# Patient Record
Sex: FEMALE | HISPANIC OR LATINO | Employment: PART TIME | URBAN - METROPOLITAN AREA
[De-identification: names, ages, dates, MRNs, and addresses within clinical notes are randomized per-mention and may not be internally consistent; named-entity substitution may affect disease eponyms.]

---

## 2018-03-20 ENCOUNTER — OFFICE VISIT (OUTPATIENT)
Dept: OBSTETRICS AND GYNECOLOGY | Facility: CLINIC | Age: 43
End: 2018-03-20
Payer: MEDICAID

## 2018-03-20 VITALS
SYSTOLIC BLOOD PRESSURE: 112 MMHG | HEIGHT: 63 IN | WEIGHT: 154.13 LBS | BODY MASS INDEX: 27.31 KG/M2 | DIASTOLIC BLOOD PRESSURE: 72 MMHG

## 2018-03-20 DIAGNOSIS — N84.1 ENDOCERVICAL POLYP: ICD-10-CM

## 2018-03-20 DIAGNOSIS — Z01.419 WELL WOMAN EXAM WITH ROUTINE GYNECOLOGICAL EXAM: ICD-10-CM

## 2018-03-20 DIAGNOSIS — Z32.01 POSITIVE URINE PREGNANCY TEST: Primary | ICD-10-CM

## 2018-03-20 DIAGNOSIS — O09.529 ANTEPARTUM MULTIGRAVIDA OF ADVANCED MATERNAL AGE: ICD-10-CM

## 2018-03-20 DIAGNOSIS — Z87.898 HISTORY OF PREDIABETES: ICD-10-CM

## 2018-03-20 PROCEDURE — 99386 PREV VISIT NEW AGE 40-64: CPT | Mod: S$PBB,,, | Performed by: OBSTETRICS & GYNECOLOGY

## 2018-03-20 PROCEDURE — 88175 CYTOPATH C/V AUTO FLUID REDO: CPT

## 2018-03-20 PROCEDURE — 87086 URINE CULTURE/COLONY COUNT: CPT

## 2018-03-20 PROCEDURE — 80307 DRUG TEST PRSMV CHEM ANLYZR: CPT

## 2018-03-20 PROCEDURE — 99202 OFFICE O/P NEW SF 15 MIN: CPT | Mod: PBBFAC,TH,PO | Performed by: OBSTETRICS & GYNECOLOGY

## 2018-03-20 PROCEDURE — 99999 PR PBB SHADOW E&M-NEW PATIENT-LVL II: CPT | Mod: PBBFAC,,, | Performed by: OBSTETRICS & GYNECOLOGY

## 2018-03-20 PROCEDURE — 87491 CHLMYD TRACH DNA AMP PROBE: CPT

## 2018-03-20 NOTE — LETTER
March 20, 2018      April Murillo MD  [address] [facility] [address]  [facility] [address]           [place] [facility]  [address] [facility], [address]  [address]th [address] [facility] [address]  Phone: [phone]          Patient: Francie Redding   MR Number: [id number]   YOB: 1975   Date of Visit: 3/20/2018       Dear Dr. April Murillo:    Thank you for referring Francie Redding to me for evaluation. Attached you will find relevant portions of my assessment and plan of care.    If you have questions, please do not hesitate to call me. I look forward to following Francie Redding along with you.    Sincerely,    Tank Moon MD    Enclosure  CC:  No Recipients    If you would like to receive this communication electronically, please contact [email or web address] or [phone] to request more information on [facility] [employer] access.    For providers and/or their staff who would like to refer a patient to [facility], please contact us through our one-stop-shop provider referral line, [facility], at [phone].    If you feel you have received this communication in error or would no longer like to receive these types of communications, please e-mail [email or web address]

## 2018-03-20 NOTE — PROGRESS NOTES
CC: Annual check-up, +upt    SUBJECTIVE:   42 y.o. female   for annual routine Pap and checkup. Patient's last menstrual period was 2018..  She complains of vaginal bleeding.        History reviewed. No pertinent past medical history.  History reviewed. No pertinent surgical history.  Social History     Social History    Marital status: Single     Spouse name: N/A    Number of children: N/A    Years of education: N/A     Occupational History    Not on file.     Social History Main Topics    Smoking status: Never Smoker    Smokeless tobacco: Never Used    Alcohol use No    Drug use: No    Sexual activity: No     Other Topics Concern    Not on file     Social History Narrative    No narrative on file     History reviewed. No pertinent family history.  OB History    Para Term  AB Living   4 1 1   2     SAB TAB Ectopic Multiple Live Births   2              # Outcome Date GA Lbr Evan/2nd Weight Sex Delivery Anes PTL Lv   4 Current            3 SAB            2 SAB            1 Term      Vag-Spont               Current Outpatient Prescriptions   Medication Sig Dispense Refill    ibuprofen (ADVIL,MOTRIN) 200 MG tablet Take 200 mg by mouth every 6 (six) hours as needed for Pain.      misoprostol (CYTOTEC) 200 MCG Tab Take 1 tablet (200 mcg total) by mouth 2 (two) times daily. 6 tablet 0    naproxen (NAPROSYN) 500 MG tablet Take 1 tablet (500 mg total) by mouth 2 (two) times daily with meals. 20 tablet 0     No current facility-administered medications for this visit.      Allergies: Patient has no known allergies.     ROS:  Constitutional: no weight loss, weight gain, fever, fatigue  Eyes:  No vision changes, glasses/contacts  ENT/Mouth: No ulcers, sinus problems, ears ringing, headache  Cardiovascular: No inability to lie flat, chest pain, exercise intolerance, swelling, heart palpitations  Respiratory: No wheezing, coughing blood, shortness of breath, or cough  Gastrointestinal: No  "diarrhea, bloody stool, nausea/vomiting, constipation, gas, hemorrhoids  Genitourinary: No blood in urine, painful urination, urgency of urination, frequency of urination, incomplete emptying, incontinence, abnormal bleeding, painful periods, heavy periods, vaginal discharge, vaginal odor, painful intercourse, sexual problems, bleeding after intercourse.  Musculoskeletal: No muscle weakness  Skin/Breast: No painful breasts, nipple discharge, masses, rash, ulcers  Neurological: No passing out, seizures, numbness, headache  Endocrine: No diabetes, hypothyroid, hyperthyroid, hot flashes, hair loss, abnormal hair growth, ance  Psychiatric: No depression, crying  Hematologic: No bruises, bleeding, swollen lymph nodes, anemia.      OBJECTIVE:   The patient appears well, alert, oriented x 3, in no distress.  /72   Ht 5' 3" (1.6 m)   Wt 69.9 kg (154 lb 1.6 oz)   LMP 01/23/2018   BMI 27.30 kg/m²   NECK: no thyromegaly, trachea midline  SKIN: no acne, striae, hirsutism  BREAST EXAM: not examined  ABDOMEN: no hernias, masses, or hepatosplenomegaly  GENITALIA: normal external genitalia, no erythema, no discharge  URETHRA: normal urethra, normal urethral meatus  VAGINA: old blood in vault  CERVIX: endocx polyp  UTERUS: enlarged, 12 weeks size  ADNEXA: normal adnexa      ASSESSMENT:   well woman  1. Positive urine pregnancy test    2. Well woman exam with routine gynecological exam    3. Endocervical polyp    4. History of prediabetes    5. Antepartum multigravida of advanced maternal age        PLAN:   pap smear  return after u/s in 1 wk  Orders Placed This Encounter    C. trachomatis/N. gonorrhoeae by AMP DNA Cervix    Urine culture    Liquid-based pap smear, screening    US OB/GYN Procedure ([facility])-Future     "

## 2018-03-21 LAB
AMPHET+METHAMPHET UR QL: NEGATIVE
BARBITURATES UR QL SCN>200 NG/ML: NEGATIVE
BENZODIAZ UR QL SCN>200 NG/ML: NEGATIVE
BZE UR QL SCN: NEGATIVE
C TRACH DNA SPEC QL NAA+PROBE: NOT DETECTED
CANNABINOIDS UR QL SCN: NEGATIVE
CREAT UR-MCNC: 73 MG/DL
ETHANOL UR-MCNC: <10 MG/DL
METHADONE UR QL SCN>300 NG/ML: NEGATIVE
N GONORRHOEA DNA SPEC QL NAA+PROBE: NOT DETECTED
OPIATES UR QL SCN: NEGATIVE
PCP UR QL SCN>25 NG/ML: NEGATIVE
TOXICOLOGY INFORMATION: NORMAL

## 2018-03-22 LAB — BACTERIA UR CULT: NO GROWTH

## 2018-03-27 ENCOUNTER — ROUTINE PRENATAL (OUTPATIENT)
Dept: OBSTETRICS AND GYNECOLOGY | Facility: CLINIC | Age: 43
End: 2018-03-27
Payer: MEDICAID

## 2018-03-27 ENCOUNTER — LAB VISIT (OUTPATIENT)
Dept: LAB | Facility: HOSPITAL | Age: 43
End: 2018-03-27
Attending: OBSTETRICS & GYNECOLOGY
Payer: MEDICAID

## 2018-03-27 ENCOUNTER — PROCEDURE VISIT (OUTPATIENT)
Dept: OBSTETRICS AND GYNECOLOGY | Facility: CLINIC | Age: 43
End: 2018-03-27
Payer: MEDICAID

## 2018-03-27 VITALS
BODY MASS INDEX: 27.14 KG/M2 | SYSTOLIC BLOOD PRESSURE: 106 MMHG | DIASTOLIC BLOOD PRESSURE: 68 MMHG | WEIGHT: 153.25 LBS

## 2018-03-27 DIAGNOSIS — Z3A.09 9 WEEKS GESTATION OF PREGNANCY: Primary | ICD-10-CM

## 2018-03-27 DIAGNOSIS — Z3A.09 9 WEEKS GESTATION OF PREGNANCY: ICD-10-CM

## 2018-03-27 DIAGNOSIS — O09.529 ENCOUNTER FOR SUPERVISION OF HIGH RISK MULTIGRAVIDA OF ADVANCED MATERNAL AGE, ANTEPARTUM: Primary | ICD-10-CM

## 2018-03-27 DIAGNOSIS — Z36.89 ENCOUNTER TO ESTABLISH GESTATIONAL AGE USING ULTRASOUND: ICD-10-CM

## 2018-03-27 DIAGNOSIS — Z32.01 POSITIVE URINE PREGNANCY TEST: ICD-10-CM

## 2018-03-27 LAB
ABO + RH BLD: NORMAL
ANISOCYTOSIS BLD QL SMEAR: SLIGHT
BASOPHILS # BLD AUTO: 0.06 K/UL
BASOPHILS NFR BLD: 0.5 %
BLD GP AB SCN CELLS X3 SERPL QL: NORMAL
DACRYOCYTES BLD QL SMEAR: ABNORMAL
DIFFERENTIAL METHOD: ABNORMAL
EOSINOPHIL # BLD AUTO: 0.9 K/UL
EOSINOPHIL NFR BLD: 7.5 %
ERYTHROCYTE [DISTWIDTH] IN BLOOD BY AUTOMATED COUNT: 20.4 %
ESTIMATED AVG GLUCOSE: 108 MG/DL
HBA1C MFR BLD HPLC: 5.4 %
HCT VFR BLD AUTO: 32.2 %
HGB BLD-MCNC: 9.7 G/DL
HYPOCHROMIA BLD QL SMEAR: ABNORMAL
LYMPHOCYTES # BLD AUTO: 1.7 K/UL
LYMPHOCYTES NFR BLD: 14.8 %
MCH RBC QN AUTO: 19.4 PG
MCHC RBC AUTO-ENTMCNC: 30.1 G/DL
MCV RBC AUTO: 64 FL
MONOCYTES # BLD AUTO: 0.8 K/UL
MONOCYTES NFR BLD: 6.7 %
NEUTROPHILS # BLD AUTO: 8.2 K/UL
NEUTROPHILS NFR BLD: 70.5 %
OVALOCYTES BLD QL SMEAR: ABNORMAL
PLATELET # BLD AUTO: 320 K/UL
PLATELET BLD QL SMEAR: ABNORMAL
PMV BLD AUTO: 9 FL
POIKILOCYTOSIS BLD QL SMEAR: SLIGHT
RBC # BLD AUTO: 5 M/UL
WBC # BLD AUTO: 11.72 K/UL

## 2018-03-27 PROCEDURE — 86850 RBC ANTIBODY SCREEN: CPT

## 2018-03-27 PROCEDURE — 99999 PR PBB SHADOW E&M-EST. PATIENT-LVL II: CPT | Mod: PBBFAC,,, | Performed by: OBSTETRICS & GYNECOLOGY

## 2018-03-27 PROCEDURE — 86703 HIV-1/HIV-2 1 RESULT ANTBDY: CPT

## 2018-03-27 PROCEDURE — 99213 OFFICE O/P EST LOW 20 MIN: CPT | Mod: TH,S$PBB,25, | Performed by: OBSTETRICS & GYNECOLOGY

## 2018-03-27 PROCEDURE — 86762 RUBELLA ANTIBODY: CPT

## 2018-03-27 PROCEDURE — 76817 TRANSVAGINAL US OBSTETRIC: CPT | Mod: PBBFAC,PO

## 2018-03-27 PROCEDURE — 76817 TRANSVAGINAL US OBSTETRIC: CPT | Mod: 26,S$PBB,, | Performed by: OBSTETRICS & GYNECOLOGY

## 2018-03-27 PROCEDURE — 87340 HEPATITIS B SURFACE AG IA: CPT

## 2018-03-27 PROCEDURE — 36415 COLL VENOUS BLD VENIPUNCTURE: CPT

## 2018-03-27 PROCEDURE — 85025 COMPLETE CBC W/AUTO DIFF WBC: CPT

## 2018-03-27 PROCEDURE — 99212 OFFICE O/P EST SF 10 MIN: CPT | Mod: PBBFAC,PO | Performed by: OBSTETRICS & GYNECOLOGY

## 2018-03-27 PROCEDURE — 83036 HEMOGLOBIN GLYCOSYLATED A1C: CPT

## 2018-03-27 PROCEDURE — 86592 SYPHILIS TEST NON-TREP QUAL: CPT

## 2018-03-27 RX ORDER — DIPHENHYDRAMINE HCL 25 MG
25 CAPSULE ORAL EVERY 6 HOURS PRN
COMMUNITY
End: 2018-05-29

## 2018-03-27 NOTE — PROGRESS NOTES
Doing well and denies any complains  Viable intrauterine pregnancy visible on ultrasound, consistent with LMP  CBC, type and screen, Hb A1c rubella, RPR, HIV and Hep B Ag ordered  MFM consulted on previous visit  Large endocx polyp    Discussed: Blood test ordered on first visit, Vitamins, folic acid, Wt Gain, Seafood and mercury, avoid deli food, unrefrigerated meats, cheeses and milk products, reason to avoid cat litter, the  accuracy of the LUCIUS, the value of an early TVS, risks of each trimester,  Aneuploidy screening, OTC medication in the first trimester, harmful effects of Smoking and ETOH, AFP screen at 15 weeks and Anatomy +/- MFM US at 18-20 weeks.  Discussed: Common complaints of pregnancy, HIV and other routine prenatal tests, Tobacco abuse, risk factors identified by prenatal history, Anticipated course of prenatal care, Nutrition and weight gain counseling,Toxoplasmosis precautions (Cats/Raw Meat) Exercise, Alcohol, Illicit/Recreational Drugs, Seat belt use, Childbirth classes/Hospital facilities.The counseling session lasted approximately 25 minutes, and all her questions were answered.

## 2018-03-28 LAB
HBV SURFACE AG SERPL QL IA: NEGATIVE
HIV 1+2 AB+HIV1 P24 AG SERPL QL IA: NEGATIVE
RPR SER QL: NORMAL
RUBV IGG SER-ACNC: 27.8 IU/ML
RUBV IGG SER-IMP: REACTIVE

## 2018-04-09 ENCOUNTER — TELEPHONE (OUTPATIENT)
Dept: OBSTETRICS AND GYNECOLOGY | Facility: CLINIC | Age: 43
End: 2018-04-09

## 2018-04-09 NOTE — TELEPHONE ENCOUNTER
Pt called to see what she could take for her cough an sore throat. Pt was instructed to ready the instructions given to her on her first visit to see what she can take. Pt was told to try the medications and if all of them don't work to give us a call. Pt verbalized understanding.

## 2018-04-09 NOTE — TELEPHONE ENCOUNTER
----- Message from Charlette Carbajal sent at 4/9/2018  9:42 AM CDT -----  Contact: Self [phone]  Patient is 11 weeks pregnant and she would like to know what medication can she take for a sore throat and severe cough. Please advice

## 2018-04-16 PROCEDURE — 99284 EMERGENCY DEPT VISIT MOD MDM: CPT | Mod: 25

## 2018-04-17 ENCOUNTER — HOSPITAL ENCOUNTER (EMERGENCY)
Facility: HOSPITAL | Age: 43
Discharge: HOME | End: 2018-04-17
Attending: EMERGENCY MEDICINE
Payer: MEDICAID

## 2018-04-17 VITALS
OXYGEN SATURATION: 99 % | TEMPERATURE: 98 F | SYSTOLIC BLOOD PRESSURE: 115 MMHG | WEIGHT: 150 LBS | DIASTOLIC BLOOD PRESSURE: 61 MMHG | BODY MASS INDEX: 25.61 KG/M2 | HEART RATE: 66 BPM | RESPIRATION RATE: 20 BRPM | HEIGHT: 64 IN

## 2018-04-17 DIAGNOSIS — O20.0 THREATENED ABORTION: Primary | ICD-10-CM

## 2018-04-17 DIAGNOSIS — O20.9 VAGINAL BLEEDING AFFECTING EARLY PREGNANCY: ICD-10-CM

## 2018-04-17 LAB
ANISOCYTOSIS BLD QL SMEAR: SLIGHT
BACTERIA #/AREA URNS HPF: NORMAL /HPF
BASOPHILS # BLD AUTO: 0.03 K/UL
BASOPHILS NFR BLD: 0.3 %
BILIRUB UR QL STRIP: NEGATIVE
CLARITY UR: CLEAR
COLOR UR: YELLOW
DACRYOCYTES BLD QL SMEAR: ABNORMAL
DIFFERENTIAL METHOD: ABNORMAL
EOSINOPHIL # BLD AUTO: 0.2 K/UL
EOSINOPHIL NFR BLD: 1.4 %
ERYTHROCYTE [DISTWIDTH] IN BLOOD BY AUTOMATED COUNT: 20.4 %
GLUCOSE UR QL STRIP: NEGATIVE
HCG INTACT+B SERPL-ACNC: NORMAL MIU/ML
HCT VFR BLD AUTO: 30.9 %
HGB BLD-MCNC: 9.4 G/DL
HGB UR QL STRIP: ABNORMAL
HYPOCHROMIA BLD QL SMEAR: ABNORMAL
KETONES UR QL STRIP: NEGATIVE
LEUKOCYTE ESTERASE UR QL STRIP: ABNORMAL
LYMPHOCYTES # BLD AUTO: 1.7 K/UL
LYMPHOCYTES NFR BLD: 15.1 %
MCH RBC QN AUTO: 20 PG
MCHC RBC AUTO-ENTMCNC: 30.4 G/DL
MCV RBC AUTO: 66 FL
MICROSCOPIC COMMENT: NORMAL
MONOCYTES # BLD AUTO: 1.1 K/UL
MONOCYTES NFR BLD: 9.5 %
NEUTROPHILS # BLD AUTO: 8.5 K/UL
NEUTROPHILS NFR BLD: 73.7 %
NITRITE UR QL STRIP: NEGATIVE
OVALOCYTES BLD QL SMEAR: ABNORMAL
PH UR STRIP: 6 [PH] (ref 5–8)
PLATELET # BLD AUTO: 252 K/UL
PLATELET BLD QL SMEAR: ABNORMAL
PMV BLD AUTO: 8.7 FL
POIKILOCYTOSIS BLD QL SMEAR: SLIGHT
POLYCHROMASIA BLD QL SMEAR: ABNORMAL
PROT UR QL STRIP: NEGATIVE
RBC # BLD AUTO: 4.71 M/UL
RBC #/AREA URNS HPF: 2 /HPF (ref 0–4)
SP GR UR STRIP: 1.02 (ref 1–1.03)
SQUAMOUS #/AREA URNS HPF: 2 /HPF
STOMATOCYTES BLD QL SMEAR: PRESENT
TARGETS BLD QL SMEAR: ABNORMAL
URN SPEC COLLECT METH UR: ABNORMAL
UROBILINOGEN UR STRIP-ACNC: NEGATIVE EU/DL
WBC # BLD AUTO: 11.53 K/UL
WBC #/AREA URNS HPF: 1 /HPF (ref 0–5)

## 2018-04-17 PROCEDURE — 81000 URINALYSIS NONAUTO W/SCOPE: CPT

## 2018-04-17 PROCEDURE — 25000003 PHARM REV CODE 250: Performed by: EMERGENCY MEDICINE

## 2018-04-17 PROCEDURE — 85025 COMPLETE CBC W/AUTO DIFF WBC: CPT

## 2018-04-17 PROCEDURE — 84702 CHORIONIC GONADOTROPIN TEST: CPT

## 2018-04-17 RX ORDER — DICYCLOMINE HYDROCHLORIDE 20 MG/1
20 TABLET ORAL 2 TIMES DAILY
Qty: 20 TABLET | Refills: 0 | Status: SHIPPED | OUTPATIENT
Start: 2018-04-17 | End: 2018-05-08

## 2018-04-17 RX ORDER — DICYCLOMINE HYDROCHLORIDE 10 MG/1
20 CAPSULE ORAL
Status: COMPLETED | OUTPATIENT
Start: 2018-04-17 | End: 2018-04-17

## 2018-04-17 RX ADMIN — DICYCLOMINE HYDROCHLORIDE 20 MG: 10 CAPSULE ORAL at 04:04

## 2018-04-17 NOTE — ED PROVIDER NOTES
Encounter Date: 4/16/2018    SCRIBE #1 NOTE: IMarilu am scribing for, and in the presence of, Dr. Lombardi.       History     Chief Complaint   Patient presents with    Vaginal Bleeding     approx. 12 weeks pregnant with onset of abdominal pain and vaginal bleeding today.      Francie Redding is a 42 y.o. female who  has no past medical history on file.    CC: vaginal bleeding    The patient who is approximately 12 weeks pregnant presents to the ED due to vaginal bleeding that began today.  Pt reports associated LLQ abdominal cramping that began yesterday.  She denies any n/v.  She did not try anything for pain at home.  Pt notes she has been having cough and sore throat for 2 weeks.  Pt states she was seen by OBGYN on 3/27/18.  US at that time revealed visible viable intrauterine pregnancy.        The history is provided by the patient. A  was used.     Review of patient's allergies indicates:  No Known Allergies  History reviewed. No pertinent past medical history.  History reviewed. No pertinent surgical history.  History reviewed. No pertinent family history.  Social History   Substance Use Topics    Smoking status: Never Smoker    Smokeless tobacco: Never Used    Alcohol use No     Review of Systems   Constitutional: Negative for fever.   HENT: Positive for sore throat.    Respiratory: Positive for cough.    Gastrointestinal: Positive for abdominal pain. Negative for nausea and vomiting.   Genitourinary: Positive for vaginal bleeding.   Musculoskeletal: Negative for back pain.   Neurological: Negative for headaches.       Physical Exam     Initial Vitals [04/16/18 2351]   BP Pulse Resp Temp SpO2   (!) 149/67 75 20 98.1 °F (36.7 °C) 100 %      MAP       94.33         Physical Exam    Nursing note and vitals reviewed.  Constitutional: She appears well-developed and well-nourished.   HENT:   Head: Normocephalic and atraumatic.   Eyes: Conjunctivae are normal.   Neck: Normal  range of motion. Neck supple.   Cardiovascular: Normal rate, regular rhythm and normal heart sounds.   Pulmonary/Chest: Breath sounds normal. No respiratory distress.   Abdominal: Soft. There is tenderness in the suprapubic area and left lower quadrant.   Musculoskeletal: Normal range of motion. She exhibits no edema or tenderness.   Neurological: She is alert and oriented to person, place, and time. She has normal strength.   Skin: Skin is warm and dry.   Psychiatric: She has a normal mood and affect.         ED Course   Procedures  Labs Reviewed   URINALYSIS - Abnormal; Notable for the following:        Result Value    Occult Blood UA 3+ (*)     Leukocytes, UA Trace (*)     All other components within normal limits   CBC W/ AUTO DIFFERENTIAL - Abnormal; Notable for the following:     Hemoglobin 9.4 (*)     Hematocrit 30.9 (*)     MCV 66 (*)     MCH 20.0 (*)     MCHC 30.4 (*)     RDW 20.4 (*)     MPV 8.7 (*)     Gran # (ANC) 8.5 (*)     Mono # 1.1 (*)     Gran% 73.7 (*)     Lymph% 15.1 (*)     All other components within normal limits   HCG, QUANTITATIVE, PREGNANCY   URINALYSIS MICROSCOPIC        Imaging Results          US OB Less Than 14 Wks First Gestation (Final result)  Result time 04/17/18 03:44:39    Final result by Joaquín Patino MD (04/17/18 03:44:39)                 Impression:      Single live intrauterine pregnancy with estimated gestational age 12 weeks 0 days.  Fetal heart rate is 163 beats per minute.  Continued sonographic follow-up is advised.    Posterior uterine fibroid.      Electronically signed by: Joaquín Patino MD  Date:    04/17/2018  Time:    03:44             Narrative:    EXAMINATION:  US OB LESS THAN 14 WEEKS FIRST GESTATION    CLINICAL HISTORY:  Other hemorrhage in early pregnancy    TECHNIQUE:  Transabdominal sonography of the pelvis was performed.    COMPARISON:  None.    FINDINGS:  The uterus measures 13.4 x 9.5 x 10.3 cm. There is an apparent posterior uterine fibroid  measuring approximately 4.8 x 4.7 x 5.0 cm.    In the uterine cavity, there is a gestational sac containing a fetal pole measuring 5.3 cm by crown rump length and corresponds to a 12 weeks 0 days gestation. Fetal heart rate is 163 BPM.  The yolk sac was not well visualized.    The right ovary measures 3.9 x 2.2 x 2.6 cm. The left ovary measures 3.6 x 3.6 x 1.3 cm. Arterial and venous flow are preserved bilaterally.  There are small bilateral ovarian cysts.  No free fluid is seen.                                  Medical Decision Making:   History:   Old Medical Records: I decided to obtain old medical records.  Initial Assessment:   42 y.o. female who is 12 weeks pregnant presents with vaginal bleeding that began yesterday and LLQ abdominal pain.  Exam with LLQ and suprapubic tenderness.  Pelvic exam with small amount of dark blood; no clots.    Clinical Tests:   Lab Tests: Ordered and Reviewed  Radiological Study: Ordered and Reviewed  ED Management:  Will obtain labs including CBC, UA, hCG, and US to further evaluate.    US revealed a single live intrauterine pregnancy with fetal heart rate of 163 bpm.  Pt will be d/c home on bentyl.  I advised her to f/u with OBGYN.       Chart review shows that the patient is O+ blood type                      Clinical Impression:   The primary encounter diagnosis was Threatened . A diagnosis of Vaginal bleeding affecting early pregnancy was also pertinent to this visit.    Disposition:   Disposition: Discharged  Condition: Stable            I, Dr. Ayse Lombardi, personally performed the services described in this documentation. All medical record entries made by the scribe were at my direction and in my presence.  I have reviewed the chart and agree that the record reflects my personal performance and is accurate and complete. Ayse Lombardi MD.  6:14 AM 2018             Ayse Lombardi MD  18 0614

## 2018-04-17 NOTE — ED TRIAGE NOTES
Pt presents to ED with c/o vaginal bleeding and abdominal pain. Pt is 12 weeks gestation. Symptoms began today.

## 2018-04-24 ENCOUNTER — ROUTINE PRENATAL (OUTPATIENT)
Dept: OBSTETRICS AND GYNECOLOGY | Facility: CLINIC | Age: 43
End: 2018-04-24
Payer: MEDICAID

## 2018-04-24 VITALS — DIASTOLIC BLOOD PRESSURE: 62 MMHG | SYSTOLIC BLOOD PRESSURE: 90 MMHG

## 2018-04-24 DIAGNOSIS — N84.1 ENDOCERVICAL POLYP: ICD-10-CM

## 2018-04-24 DIAGNOSIS — Z3A.13 13 WEEKS GESTATION OF PREGNANCY: Primary | ICD-10-CM

## 2018-04-24 DIAGNOSIS — O21.9 NAUSEA AND VOMITING IN PREGNANCY PRIOR TO 22 WEEKS GESTATION: ICD-10-CM

## 2018-04-24 PROCEDURE — 99999 PR PBB SHADOW E&M-EST. PATIENT-LVL I: CPT | Mod: PBBFAC,,, | Performed by: OBSTETRICS & GYNECOLOGY

## 2018-04-24 PROCEDURE — 99211 OFF/OP EST MAY X REQ PHY/QHP: CPT | Mod: PBBFAC,TH,PO | Performed by: OBSTETRICS & GYNECOLOGY

## 2018-04-24 PROCEDURE — 99213 OFFICE O/P EST LOW 20 MIN: CPT | Mod: TH,S$PBB,, | Performed by: OBSTETRICS & GYNECOLOGY

## 2018-04-24 RX ORDER — PROMETHAZINE HYDROCHLORIDE 12.5 MG/1
12.5 SUPPOSITORY RECTAL EVERY 4 HOURS PRN
Qty: 12 SUPPOSITORY | Refills: 1 | Status: SHIPPED | OUTPATIENT
Start: 2018-04-24 | End: 2018-05-08

## 2018-04-24 RX ORDER — PROMETHAZINE HYDROCHLORIDE 12.5 MG/1
12.5 TABLET ORAL EVERY 4 HOURS PRN
Qty: 100 TABLET | Refills: 2 | Status: SHIPPED | OUTPATIENT
Start: 2018-04-24 | End: 2018-07-10

## 2018-04-24 NOTE — PROGRESS NOTES
Having some nausea and very little PO intake.  B6 is not working.  Went to ED with some vomiting and vaginal bleeding but bleeding has stopped.  No cramping. + fetal movement  Does not want Quad Screen  FHT's 140's  Will order PO promethazine and suppositories in case she needs them  RTC 3-4 wks

## 2018-05-08 ENCOUNTER — ROUTINE PRENATAL (OUTPATIENT)
Dept: OBSTETRICS AND GYNECOLOGY | Facility: CLINIC | Age: 43
End: 2018-05-08
Payer: MEDICAID

## 2018-05-08 VITALS
WEIGHT: 155.44 LBS | BODY MASS INDEX: 26.68 KG/M2 | SYSTOLIC BLOOD PRESSURE: 102 MMHG | DIASTOLIC BLOOD PRESSURE: 66 MMHG

## 2018-05-08 DIAGNOSIS — Z3A.15 15 WEEKS GESTATION OF PREGNANCY: ICD-10-CM

## 2018-05-08 PROCEDURE — 99212 OFFICE O/P EST SF 10 MIN: CPT | Mod: PBBFAC,PO | Performed by: OBSTETRICS & GYNECOLOGY

## 2018-05-08 PROCEDURE — 99213 OFFICE O/P EST LOW 20 MIN: CPT | Mod: TH,S$PBB,, | Performed by: OBSTETRICS & GYNECOLOGY

## 2018-05-08 PROCEDURE — 99999 PR PBB SHADOW E&M-EST. PATIENT-LVL II: CPT | Mod: PBBFAC,,, | Performed by: OBSTETRICS & GYNECOLOGY

## 2018-05-08 RX ORDER — FERROUS SULFATE 325(65) MG
TABLET ORAL
COMMUNITY
End: 2018-10-23

## 2018-05-08 RX ORDER — ERGOCALCIFEROL 1.25 MG/1
CAPSULE ORAL
COMMUNITY
End: 2018-05-29

## 2018-05-08 NOTE — PROGRESS NOTES
Patient doing well, denies N/V, no longer requiring medication  +fetal movement, no contractions/vag dc/vag bleeding  FHT's 130's  RTC 3 wks, ultrasound in 5-6 wks

## 2018-05-29 ENCOUNTER — ROUTINE PRENATAL (OUTPATIENT)
Dept: OBSTETRICS AND GYNECOLOGY | Facility: CLINIC | Age: 43
End: 2018-05-29
Payer: MEDICAID

## 2018-05-29 VITALS
DIASTOLIC BLOOD PRESSURE: 64 MMHG | SYSTOLIC BLOOD PRESSURE: 102 MMHG | WEIGHT: 157.44 LBS | BODY MASS INDEX: 27.02 KG/M2

## 2018-05-29 DIAGNOSIS — Z3A.18 18 WEEKS GESTATION OF PREGNANCY: Primary | ICD-10-CM

## 2018-05-29 PROCEDURE — 99999 PR PBB SHADOW E&M-EST. PATIENT-LVL II: CPT | Mod: PBBFAC,,, | Performed by: OBSTETRICS & GYNECOLOGY

## 2018-05-29 PROCEDURE — 99213 OFFICE O/P EST LOW 20 MIN: CPT | Mod: TH,S$PBB,, | Performed by: OBSTETRICS & GYNECOLOGY

## 2018-05-29 PROCEDURE — 99212 OFFICE O/P EST SF 10 MIN: CPT | Mod: PBBFAC,PO | Performed by: OBSTETRICS & GYNECOLOGY

## 2018-05-29 NOTE — PROGRESS NOTES
Doing well.  +fetal movement, denies vag bleeding/ctx's.  FHT's 140's, will schedule ultrasound.   RTC 4 weeks.

## 2018-06-12 ENCOUNTER — ROUTINE PRENATAL (OUTPATIENT)
Dept: OBSTETRICS AND GYNECOLOGY | Facility: CLINIC | Age: 43
End: 2018-06-12
Payer: MEDICAID

## 2018-06-12 ENCOUNTER — PROCEDURE VISIT (OUTPATIENT)
Dept: OBSTETRICS AND GYNECOLOGY | Facility: CLINIC | Age: 43
End: 2018-06-12
Payer: MEDICAID

## 2018-06-12 VITALS — BODY MASS INDEX: 26.6 KG/M2 | WEIGHT: 155 LBS | SYSTOLIC BLOOD PRESSURE: 120 MMHG | DIASTOLIC BLOOD PRESSURE: 76 MMHG

## 2018-06-12 DIAGNOSIS — Z3A.15 15 WEEKS GESTATION OF PREGNANCY: ICD-10-CM

## 2018-06-12 DIAGNOSIS — O03.4 RETAINED PRODUCTS OF CONCEPTION AFTER MISCARRIAGE: ICD-10-CM

## 2018-06-12 DIAGNOSIS — D25.1 INTRAMURAL AND SUBMUCOUS LEIOMYOMA OF UTERUS: ICD-10-CM

## 2018-06-12 DIAGNOSIS — O09.529 AMA (ADVANCED MATERNAL AGE) MULTIGRAVIDA 35+, UNSPECIFIED TRIMESTER: Primary | ICD-10-CM

## 2018-06-12 DIAGNOSIS — O34.12 UTERINE FIBROIDS AFFECTING PREGNANCY IN SECOND TRIMESTER: ICD-10-CM

## 2018-06-12 DIAGNOSIS — O03.9 COMPLETE ABORTION: Primary | ICD-10-CM

## 2018-06-12 DIAGNOSIS — D25.9 UTERINE FIBROIDS AFFECTING PREGNANCY IN SECOND TRIMESTER: ICD-10-CM

## 2018-06-12 DIAGNOSIS — D25.0 INTRAMURAL AND SUBMUCOUS LEIOMYOMA OF UTERUS: ICD-10-CM

## 2018-06-12 PROCEDURE — 99999 PR PBB SHADOW E&M-EST. PATIENT-LVL II: CPT | Mod: PBBFAC,,, | Performed by: OBSTETRICS & GYNECOLOGY

## 2018-06-12 PROCEDURE — 99213 OFFICE O/P EST LOW 20 MIN: CPT | Mod: S$PBB,TH,25, | Performed by: OBSTETRICS & GYNECOLOGY

## 2018-06-12 PROCEDURE — 76805 OB US >/= 14 WKS SNGL FETUS: CPT | Mod: PBBFAC,PO

## 2018-06-12 PROCEDURE — 99212 OFFICE O/P EST SF 10 MIN: CPT | Mod: PBBFAC,TH,PO | Performed by: OBSTETRICS & GYNECOLOGY

## 2018-06-12 PROCEDURE — 76805 OB US >/= 14 WKS SNGL FETUS: CPT | Mod: 26,S$PBB,, | Performed by: OBSTETRICS & GYNECOLOGY

## 2018-06-12 RX ORDER — IBUPROFEN 800 MG/1
800 TABLET ORAL 3 TIMES DAILY
COMMUNITY
End: 2018-08-28

## 2018-06-25 ENCOUNTER — TELEPHONE (OUTPATIENT)
Dept: OBSTETRICS AND GYNECOLOGY | Facility: CLINIC | Age: 43
End: 2018-06-25

## 2018-06-25 NOTE — TELEPHONE ENCOUNTER
----- Message from Kamryn Ozuna sent at 6/25/2018  1:50 PM CDT -----  Contact: self, [phone]  Patient requests to speak with you regarding records from [place] for her to be able to claim her babies ashes. Please advise.

## 2018-06-25 NOTE — TELEPHONE ENCOUNTER
Spoke to Pt and she stated that she still hadn't gotten the ashes of her child and wanted to know if we had received the records from there. Pt was told that we had not and I call to get there number to fax over the release form. Pt was asked to come by to the office in [place] to get that signed and sent. Pt accepted and verbalized understanding.

## 2018-07-09 ENCOUNTER — TELEPHONE (OUTPATIENT)
Dept: OBSTETRICS AND GYNECOLOGY | Facility: CLINIC | Age: 43
End: 2018-07-09

## 2018-07-09 NOTE — TELEPHONE ENCOUNTER
----- Message from Charlette Carbajal sent at 7/9/2018 10:18 AM CDT -----  Contact: Self [phone]  Patient is calling to see if she can be seen this week due to she is not feeling well she is still bleeding and having blood clots. Please advice

## 2018-07-09 NOTE — TELEPHONE ENCOUNTER
Pt requested appt for this week due to increase in vaginal bleeding with clots. Pt stated that she was not changing pads every hr but was advised that in case she did to go to the ED. Pt scheduled for 7/10 in [place]. Pt accepted and verbalized understanding.

## 2018-07-10 ENCOUNTER — OFFICE VISIT (OUTPATIENT)
Dept: OBSTETRICS AND GYNECOLOGY | Facility: CLINIC | Age: 43
End: 2018-07-10
Payer: MEDICAID

## 2018-07-10 ENCOUNTER — TELEPHONE (OUTPATIENT)
Dept: ADMINISTRATIVE | Facility: OTHER | Age: 43
End: 2018-07-10

## 2018-07-10 VITALS
HEIGHT: 64 IN | DIASTOLIC BLOOD PRESSURE: 62 MMHG | BODY MASS INDEX: 25.03 KG/M2 | SYSTOLIC BLOOD PRESSURE: 110 MMHG | WEIGHT: 146.63 LBS

## 2018-07-10 DIAGNOSIS — D25.1 INTRAMURAL AND SUBMUCOUS LEIOMYOMA OF UTERUS: ICD-10-CM

## 2018-07-10 DIAGNOSIS — N93.9 ABNORMAL UTERINE BLEEDING (AUB): Primary | ICD-10-CM

## 2018-07-10 DIAGNOSIS — O03.9 COMPLETE ABORTION: Primary | ICD-10-CM

## 2018-07-10 DIAGNOSIS — D25.9 FIBROID, UTERINE: ICD-10-CM

## 2018-07-10 DIAGNOSIS — D25.0 INTRAMURAL AND SUBMUCOUS LEIOMYOMA OF UTERUS: ICD-10-CM

## 2018-07-10 PROBLEM — Z3A.15 15 WEEKS GESTATION OF PREGNANCY: Status: RESOLVED | Noted: 2018-05-08 | Resolved: 2018-07-10

## 2018-07-10 PROCEDURE — 99999 PR PBB SHADOW E&M-EST. PATIENT-LVL II: CPT | Mod: PBBFAC,,, | Performed by: OBSTETRICS & GYNECOLOGY

## 2018-07-10 PROCEDURE — 99212 OFFICE O/P EST SF 10 MIN: CPT | Mod: PBBFAC,TH,PO | Performed by: OBSTETRICS & GYNECOLOGY

## 2018-07-10 PROCEDURE — 99214 OFFICE O/P EST MOD 30 MIN: CPT | Mod: S$PBB,TH,, | Performed by: OBSTETRICS & GYNECOLOGY

## 2018-07-10 RX ORDER — LIDOCAINE HYDROCHLORIDE 10 MG/ML
1 INJECTION, SOLUTION EPIDURAL; INFILTRATION; INTRACAUDAL; PERINEURAL ONCE
Status: CANCELLED | OUTPATIENT
Start: 2018-07-10 | End: 2018-07-10

## 2018-07-10 RX ORDER — MUPIROCIN 20 MG/G
OINTMENT TOPICAL
Status: CANCELLED | OUTPATIENT
Start: 2018-07-10

## 2018-07-10 RX ORDER — ONDANSETRON 4 MG/1
8 TABLET, ORALLY DISINTEGRATING ORAL EVERY 8 HOURS PRN
Status: CANCELLED | OUTPATIENT
Start: 2018-07-10

## 2018-07-10 NOTE — H&P
CC:persistent vb  Chief Complaint   Patient presents with    Vaginal Bleeding       patient complaining of bleeding since SAB and has not stopped.         HPI:  41 yo female presents to the clinic with the complaint of vaginal bleeding. She had a miscarriage 4-5 weeks ago. She had an increase in vaginal bleeding with clots. She also complaints of cramping in the pelvic region. She denies vaginal discharge. She was 19 weeks gestational week. She was in [place] when she miscarried and had a D&C done in the hospital. She denies N/V/F/C. Sometimes she experiences CP in the mid-sternal region and she has trouble breathing with inspiration. She also feels tired a lot.      42 y.o.            OB History       Para Term  AB Living     4 1 1 0 3       SAB TAB Ectopic Multiple Live Births     2       0         Complaining of:         (Not in a hospital admission)     Review of patient's allergies indicates:  No Known Allergies      History reviewed. No pertinent past medical history.  History reviewed. No pertinent surgical history.  History reviewed. No pertinent family history.       Social History   Substance Use Topics    Smoking status: Never Smoker    Smokeless tobacco: Never Used    Alcohol use No      ROS:  GENERAL: Feeling well overall. Denies fever or chills.   SKIN: Denies rash or lesions.   HEAD: Denies head injury or headache.   NODES: Denies enlarged lymph nodes.   CHEST: Denies chest pain or shortness of breath.   CARDIOVASCULAR: Denies palpitations or left sided chest pain.    ABDOMEN: Denies diarrhea, nausea, vomiting or rectal bleeding.   URINARY: No dysuria, hematuria, or burning on urination.  REPRODUCTIVE: See HPI.   BREASTS: Denies pain, lumps, or nipple discharge.   HEMATOLOGIC: No easy bruisability or excessive bleeding.   MUSCULOSKELETAL: Denies joint pain or swelling.   NEUROLOGIC: Denies syncope or weakness.   PSYCHIATRIC: Denies depression, anxiety or mood swings.        PE: BP  "110/62 (BP Location: Right arm)   Ht 5' 4" (1.626 m)   Wt 66.5 kg (146 lb 9.7 oz)   LMP 01/23/2018   BMI 25.16 kg/m²       APPEARANCE: Well nourished, well developed, in no acute distress.  SKIN: Normal skin turgor, no lesions.  NECK: Neck symmetric without masses or thyromegaly.  NODES: No inguinal, cervical, axillary or femoral lymph node enlargement.  CARDIOVASCULAR: Normal S1, S2. No rubs, murmurs or gallops.  NEUROLOGIC: Normal mood and affect. No depression or anxiety.   ABDOMEN: Soft. No tenderness or masses. No hepatosplenomegaly. No hernias.  RESPIRATORY: Normal respiratory effort with no retractions or use of accessory muscles.  BREASTS: Symmetrical, no skin changes or visible lesions. No palpable masses, nipple discharge, or adenopathy bilaterally.   BLADDER: Non-tender  GENITALIA: normal external genitalia, no erythema, no discharge  URETHRA: normal urethra, normal urethral meatus  VAGINA: blood in vault  CERVIX: no lesions or cervical motion tenderness  UTERUS: enlarged, 12-14  weeks size and irregular  ADNEXA: normal adnexa  Result Narrative        Indication  ========  s/p ab Sat 06/09/2018 in [place].    Method  ======  Transabdominal ultrasound examination.    Pregnancy  =========  Esposito pregnancy. Number of fetuses: none.    Dating  ======  LMP on: 1/23/2018  Cycle: regular cycle  GA by LMP 20 w + 0 d  LUCIUS by LMP: 10/30/2018  Assigned: Dating performed on 03/27/2018, based on the LMP  Assigned GA 20 w + 0 d  Assigned LUCIUS: 10/30/2018    Assessment  ==========  Gestational sac: not visualized.  Yolk sac: not visualized.    Maternal Structures  ===============  Uterus / Cervix  Uterus: Abnormal  Uterus position: anteverted  Uterus long 15.9 cm  Uterus ap 8.4 cm  Uterus tr 13.4 cm  Uterus vol 940.4 cmÂ³  Endometrium: clearly visualized,  Endometrial thickness, total 34.1 mm  Fibroids: Fibroids identified  Findings: lt lat  D1 55.1 mm  D2 57.2 mm  D3 48.8 mm  Mean 53.7 mm  Vol 80.603 " cmÂ³  Findings: ant  D1 33.4 mm  D2 41.6 mm  D3 32.0 mm  Mean 35.7 mm  Vol 23.227 cmÂ³  Findings: ant  D1 23.0 mm  D2 26.6 mm  D3 17.5 mm  Mean 22.4 mm  Vol 5.576 cmÂ³  Findings: post  D1 28.2 mm  D2 24.0 mm  D3 14.5 mm  Mean 22.2 mm  Vol 5.126 cmÂ³  Findings: post fund  D1 30.3 mm  D2 27.2 mm  D3 25.2 mm  Mean 27.6 mm  Vol 10.902 cmÂ³    Sonographer Comment  ==================  thick endometrium w/ complex appearance/ multiple fibroids.    Impression  =========  thick endometrium w/ complex appearance/ multiple fibroids.                                                      Sonographer: Rocío Kinsey  Electronically Signed by: Tank Moon M.D. at 2018-13:48         ASSESSMENT/ PLAN     Francie was seen today for vaginal bleeding.     Diagnoses and all orders for this visit:     Complete   -     CBC auto differential; Future  -     hCG, quantitative; Future  -     Type & Screen - Ob Profile; Future     Intramural and submucous leiomyoma of uterus     Retained placenta with hemorrhage, postpartum condition  -     CBC auto differential; Future  -     hCG, quantitative; Future  -     Type & Screen - Ob Profile; Future        D&C hyst and possible [facility] cl[facility] myomectomy at [facility]  F/u on laqbs        Tank Moon

## 2018-07-10 NOTE — PROGRESS NOTES
CC:persistent vb  Chief Complaint   Patient presents with    Vaginal Bleeding     patient complaining of bleeding since SAB and has not stopped.       HPI:  41 yo female presents to the clinic with the complaint of vaginal bleeding. She had a miscarriage 4-5 weeks ago. She had an increase in vaginal bleeding with clots. She also complaints of cramping in the pelvic region. She denies vaginal discharge. She was 19 weeks gestational week. She was in [place] when she miscarried and had a D&C done in the hospital. She denies N/V/F/C. Sometimes she experiences CP in the mid-sternal region and she has trouble breathing with inspiration. She also feels tired a lot.     42 y.o.   OB History      Para Term  AB Living    4 1 1 0 3      SAB TAB Ectopic Multiple Live Births    2       0        Complaining of:       (Not in a hospital admission)    Review of patient's allergies indicates:  No Known Allergies     History reviewed. No pertinent past medical history.  History reviewed. No pertinent surgical history.  History reviewed. No pertinent family history.  Social History   Substance Use Topics    Smoking status: Never Smoker    Smokeless tobacco: Never Used    Alcohol use No     ROS:  GENERAL: Feeling well overall. Denies fever or chills.   SKIN: Denies rash or lesions.   HEAD: Denies head injury or headache.   NODES: Denies enlarged lymph nodes.   CHEST: Denies chest pain or shortness of breath.   CARDIOVASCULAR: Denies palpitations or left sided chest pain.    ABDOMEN: Denies diarrhea, nausea, vomiting or rectal bleeding.   URINARY: No dysuria, hematuria, or burning on urination.  REPRODUCTIVE: See HPI.   BREASTS: Denies pain, lumps, or nipple discharge.   HEMATOLOGIC: No easy bruisability or excessive bleeding.   MUSCULOSKELETAL: Denies joint pain or swelling.   NEUROLOGIC: Denies syncope or weakness.   PSYCHIATRIC: Denies depression, anxiety or mood swings.      PE: /62 (BP Location: Right arm)   " Ht 5' 4" (1.626 m)   Wt 66.5 kg (146 lb 9.7 oz)   LMP 01/23/2018   BMI 25.16 kg/m²      APPEARANCE: Well nourished, well developed, in no acute distress.  SKIN: Normal skin turgor, no lesions.  NECK: Neck symmetric without masses or thyromegaly.  NODES: No inguinal, cervical, axillary or femoral lymph node enlargement.  CARDIOVASCULAR: Normal S1, S2. No rubs, murmurs or gallops.  NEUROLOGIC: Normal mood and affect. No depression or anxiety.   ABDOMEN: Soft. No tenderness or masses. No hepatosplenomegaly. No hernias.  RESPIRATORY: Normal respiratory effort with no retractions or use of accessory muscles.  BREASTS: Symmetrical, no skin changes or visible lesions. No palpable masses, nipple discharge, or adenopathy bilaterally.   BLADDER: Non-tender  GENITALIA: normal external genitalia, no erythema, no discharge  URETHRA: normal urethra, normal urethral meatus  VAGINA: blood in vault  CERVIX: no lesions or cervical motion tenderness  UTERUS: enlarged, 12-14  weeks size and irregular  ADNEXA: normal adnexa  Result Narrative       Indication  ========  s/p ab Sat 06/09/2018 in [place].    Method  ======  Transabdominal ultrasound examination.    Pregnancy  =========  Esposito pregnancy. Number of fetuses: none.    Dating  ======  LMP on: 1/23/2018  Cycle: regular cycle  GA by LMP 20 w + 0 d  LUCIUS by LMP: 10/30/2018  Assigned: Dating performed on 03/27/2018, based on the LMP  Assigned GA 20 w + 0 d  Assigned LUCIUS: 10/30/2018    Assessment  ==========  Gestational sac: not visualized.  Yolk sac: not visualized.    Maternal Structures  ===============  Uterus / Cervix  Uterus: Abnormal  Uterus position: anteverted  Uterus long 15.9 cm  Uterus ap 8.4 cm  Uterus tr 13.4 cm  Uterus vol 940.4 cmÂ³  Endometrium: clearly visualized,  Endometrial thickness, total 34.1 mm  Fibroids: Fibroids identified  Findings: lt lat  D1 55.1 mm  D2 57.2 mm  D3 48.8 mm  Mean 53.7 mm  Vol 80.603 cmÂ³  Findings: ant  D1 33.4 mm  D2 41.6 " mm  D3 32.0 mm  Mean 35.7 mm  Vol 23.227 cmÂ³  Findings: ant  D1 23.0 mm  D2 26.6 mm  D3 17.5 mm  Mean 22.4 mm  Vol 5.576 cmÂ³  Findings: post  D1 28.2 mm  D2 24.0 mm  D3 14.5 mm  Mean 22.2 mm  Vol 5.126 cmÂ³  Findings: post fund  D1 30.3 mm  D2 27.2 mm  D3 25.2 mm  Mean 27.6 mm  Vol 10.902 cmÂ³    Sonographer Comment  ==================  thick endometrium w/ complex appearance/ multiple fibroids.    Impression  =========  thick endometrium w/ complex appearance/ multiple fibroids.                                                      Sonographer: Rocío Kinsey  Electronically Signed by: Tank Moon M.D. at 2018-13:48       ASSESSMENT/ PLAN    Francie was seen today for vaginal bleeding.    Diagnoses and all orders for this visit:    Complete   -     CBC auto differential; Future  -     hCG, quantitative; Future  -     Type & Screen - Ob Profile; Future    Intramural and submucous leiomyoma of uterus    Retained placenta with hemorrhage, postpartum condition  -     CBC auto differential; Future  -     hCG, quantitative; Future  -     Type & Screen - Ob Profile; Future      D&C hyst and possible carlton clr myomectomy at [facility]      Tank Moon MD

## 2018-07-12 PROBLEM — O03.4 INCOMPLETE ABORTION: Status: ACTIVE | Noted: 2018-07-12

## 2018-07-12 PROBLEM — O03.9 COMPLETE ABORTION: Status: ACTIVE | Noted: 2018-07-12

## 2018-07-13 ENCOUNTER — TELEPHONE (OUTPATIENT)
Dept: OBSTETRICS AND GYNECOLOGY | Facility: CLINIC | Age: 43
End: 2018-07-13

## 2018-07-13 NOTE — TELEPHONE ENCOUNTER
Pt called to schedule appt for post-op. Pt scheduled for 7/17 in [place]. Pt accepted and verbalized understanding.

## 2018-07-13 NOTE — TELEPHONE ENCOUNTER
----- Message from Esperanza Lincoln sent at 7/13/2018  9:24 AM CDT -----  Contact: self/[phone]  She has an appointment on 7/24 but she feels she needs something sooner.

## 2018-07-16 ENCOUNTER — TELEPHONE (OUTPATIENT)
Dept: OBSTETRICS AND GYNECOLOGY | Facility: CLINIC | Age: 43
End: 2018-07-16

## 2018-07-16 NOTE — TELEPHONE ENCOUNTER
----- Message from Lulu Shrestha sent at 7/13/2018  4:32 PM CDT -----  Contact: [facility] [phone]  Pharmacy would like to speak with you about medication oxyCODONE-acetaminophen 5-325 mg per tablet 1 tablet. He is requesting to get it escribed or a new hard copy. Please advise.

## 2018-07-16 NOTE — TELEPHONE ENCOUNTER
Pt stated that she wanted to consult the  tomorrow at her visit in regards to the medication. Pt stated that she woke up today with muscle pain and a bit of a possible fever. Pt was told to go to the ER if she cannot wait till tomorrow. Pt verbalized understanding.

## 2018-07-17 ENCOUNTER — OFFICE VISIT (OUTPATIENT)
Dept: OBSTETRICS AND GYNECOLOGY | Facility: CLINIC | Age: 43
End: 2018-07-17
Payer: MEDICAID

## 2018-07-17 VITALS
WEIGHT: 145.5 LBS | HEIGHT: 61 IN | BODY MASS INDEX: 27.47 KG/M2 | DIASTOLIC BLOOD PRESSURE: 86 MMHG | SYSTOLIC BLOOD PRESSURE: 126 MMHG

## 2018-07-17 DIAGNOSIS — D25.1 INTRAMURAL AND SUBMUCOUS LEIOMYOMA OF UTERUS: Primary | ICD-10-CM

## 2018-07-17 DIAGNOSIS — D25.0 INTRAMURAL AND SUBMUCOUS LEIOMYOMA OF UTERUS: Primary | ICD-10-CM

## 2018-07-17 PROCEDURE — 99999 PR PBB SHADOW E&M-EST. PATIENT-LVL II: CPT | Mod: PBBFAC,,, | Performed by: OBSTETRICS & GYNECOLOGY

## 2018-07-17 PROCEDURE — 99213 OFFICE O/P EST LOW 20 MIN: CPT | Mod: S$PBB,,, | Performed by: OBSTETRICS & GYNECOLOGY

## 2018-07-17 PROCEDURE — 99212 OFFICE O/P EST SF 10 MIN: CPT | Mod: PBBFAC,PO | Performed by: OBSTETRICS & GYNECOLOGY

## 2018-07-17 NOTE — PROGRESS NOTES
"CC:fibroids  Chief Complaint   Patient presents with    Post-op Evaluation       HPI:    42 y.o.   OB History      Para Term  AB Living    4 1 1 0 3      SAB TAB Ectopic Multiple Live Births    2       0      doing ok post op, minimal vb, left arm swollen and bruised from blood transfusion      (Not in a hospital admission)    Review of patient's allergies indicates:  No Known Allergies     History reviewed. No pertinent past medical history.  Past Surgical History:   Procedure Laterality Date    HYSTEROSCOPIC RESECTION OF MYOMA N/A 2018    Procedure: MYOMECTOMY, HYSTEROSCOPIC;  Surgeon: Tank Moon MD;  Location: [facility] OR;  Service: OB/GYN;  Laterality: N/A;    HYSTEROSCOPY WITH DILATION AND CURETTAGE OF UTERUS N/A 2018    Procedure: HYSTEROSCOPY, WITH DILATION AND CURETTAGE OF UTERUS;  Surgeon: Tank Moon MD;  Location: [facility] OR;  Service: OB/GYN;  Laterality: N/A;     History reviewed. No pertinent family history.  Social History   Substance Use Topics    Smoking status: Never Smoker    Smokeless tobacco: Never Used    Alcohol use No     ROS:  GENERAL: Feeling well overall. Denies fever or chills.   SKIN: Denies rash or lesions.   HEAD: Denies head injury or headache.   NODES: Denies enlarged lymph nodes.   CHEST: Denies chest pain or shortness of breath.   CARDIOVASCULAR: Denies palpitations or left sided chest pain.    ABDOMEN: Denies diarrhea, nausea, vomiting or rectal bleeding.   URINARY: No dysuria, hematuria, or burning on urination.  REPRODUCTIVE: See HPI.   BREASTS: Denies pain, lumps, or nipple discharge.   HEMATOLOGIC: No easy bruisability or excessive bleeding.   MUSCULOSKELETAL: Denies joint pain or swelling.   NEUROLOGIC: Denies syncope or weakness.   PSYCHIATRIC: Denies depression, anxiety or mood swings.      PE: /86   Ht 5' 1" (1.549 m)   Wt 66 kg (145 lb 8.1 oz)   BMI 27.49 kg/m²      APPEARANCE: Well nourished, well developed, in no acute " distress.  SKIN: Normal skin turgor, no lesions.  NECK: Neck symmetric without masses or thyromegaly.  NODES: No inguinal, cervical, axillary or femoral lymph node enlargement.  CARDIOVASCULAR: Normal S1, S2. No rubs, murmurs or gallops.  NEUROLOGIC: Normal mood and affect. No depression or anxiety.   ABDOMEN: Soft. No tenderness or masses. No hepatosplenomegaly. No hernias.  RESPIRATORY: Normal respiratory effort with no retractions or use of accessory muscles.  Minimal brusing in left upper wrist  PreOperative Diagnosis  Uterine fibroid, retained placenta, abnormal uterine bleeding.  SPECIMEN  1) Products of conception and endometrial curettage.  FINAL PATHOLOGIC DIAGNOSIS  Products of conception, uterine curettage:  -Decidualized endometrium with ischemic changes and necrotic placental tissue.  -Myometrium with implantation site and focal features of exaggerated placental site.  -Fragments of myometrium compatible with leiomyoma.  -Fragments of benign endocervical epithelium.  Diagnosed by: Robe Villanueva M.D.  (Electronically Signed: 2018-07-17 13:40:33)    ASSESSMENT/ PLAN    Francie was seen today for post-op evaluation.    Diagnoses and all orders for this visit:    Intramural and submucous leiomyoma of uterus  -     hCG, quantitative; Future    monitor for heavy vb  Doesn't want to get pregnant again  Discussed contraception pt doesn't want anything at this time    preop hcg was 25, will rpt it in 1 wk  rtc 6 wks  Warm compresses for arm  Tank Moon MD

## 2018-07-24 ENCOUNTER — LAB VISIT (OUTPATIENT)
Dept: LAB | Facility: HOSPITAL | Age: 43
End: 2018-07-24
Attending: OBSTETRICS & GYNECOLOGY
Payer: MEDICAID

## 2018-07-24 ENCOUNTER — CLINICAL SUPPORT (OUTPATIENT)
Dept: OBSTETRICS AND GYNECOLOGY | Facility: CLINIC | Age: 43
End: 2018-07-24
Payer: MEDICAID

## 2018-07-24 DIAGNOSIS — D25.1 INTRAMURAL AND SUBMUCOUS LEIOMYOMA OF UTERUS: ICD-10-CM

## 2018-07-24 DIAGNOSIS — Z30.42 ENCOUNTER FOR DEPO-PROVERA CONTRACEPTION: Primary | ICD-10-CM

## 2018-07-24 DIAGNOSIS — D25.0 INTRAMURAL AND SUBMUCOUS LEIOMYOMA OF UTERUS: ICD-10-CM

## 2018-07-24 LAB
B-HCG UR QL: NEGATIVE
CTP QC/QA: YES
HCG INTACT+B SERPL-ACNC: <1.2 MIU/ML

## 2018-07-24 PROCEDURE — 36415 COLL VENOUS BLD VENIPUNCTURE: CPT

## 2018-07-24 PROCEDURE — 99212 OFFICE O/P EST SF 10 MIN: CPT | Mod: PBBFAC,PO

## 2018-07-24 PROCEDURE — 99999 PR PBB SHADOW E&M-EST. PATIENT-LVL II: CPT | Mod: PBBFAC,,,

## 2018-07-24 PROCEDURE — 96372 THER/PROPH/DIAG INJ SC/IM: CPT | Mod: PBBFAC,PO

## 2018-07-24 PROCEDURE — 84702 CHORIONIC GONADOTROPIN TEST: CPT

## 2018-07-24 PROCEDURE — 81025 URINE PREGNANCY TEST: CPT | Mod: PBBFAC,PO

## 2018-07-24 RX ORDER — MEDROXYPROGESTERONE ACETATE 150 MG/ML
150 INJECTION, SUSPENSION INTRAMUSCULAR
Status: SHIPPED | OUTPATIENT
Start: 2018-07-24

## 2018-07-24 RX ADMIN — MEDROXYPROGESTERONE ACETATE 150 MG: 150 INJECTION, SUSPENSION INTRAMUSCULAR at 03:07

## 2018-07-24 NOTE — PROGRESS NOTES
Administered Depo Provera 150 mg/ml @ 1537 07/24/2018  1 ml IM inj in L upper quad gluteus  Patient Tolerated well.  Patient instructed to return on 10/15/18 for next injection.   Patient verbalized understanding.   Lot:[id number]   Exp: 04/2020  Reviewed patient pain scale, allergies, medications, preffered pharmacy, fall risk, and advance directives verbally prior to injection.     Date of last pap/visit:3/26/2018  Last Depo-Provera: Visit date not found  UPT indicated?yes negative  Ordering Provider: Dr. Moon

## 2018-08-28 ENCOUNTER — OFFICE VISIT (OUTPATIENT)
Dept: OBSTETRICS AND GYNECOLOGY | Facility: CLINIC | Age: 43
End: 2018-08-28
Payer: MEDICAID

## 2018-08-28 ENCOUNTER — LAB VISIT (OUTPATIENT)
Dept: LAB | Facility: HOSPITAL | Age: 43
End: 2018-08-28
Attending: OBSTETRICS & GYNECOLOGY
Payer: MEDICAID

## 2018-08-28 VITALS
HEIGHT: 61 IN | SYSTOLIC BLOOD PRESSURE: 140 MMHG | WEIGHT: 150.38 LBS | DIASTOLIC BLOOD PRESSURE: 90 MMHG | BODY MASS INDEX: 28.39 KG/M2

## 2018-08-28 DIAGNOSIS — Z30.42 ENCOUNTER FOR SURVEILLANCE OF INJECTABLE CONTRACEPTIVE: ICD-10-CM

## 2018-08-28 DIAGNOSIS — D25.0 FIBROIDS, SUBMUCOSAL: Primary | ICD-10-CM

## 2018-08-28 DIAGNOSIS — F51.02 ADJUSTMENT INSOMNIA: ICD-10-CM

## 2018-08-28 PROBLEM — O03.4 INCOMPLETE ABORTION: Status: RESOLVED | Noted: 2018-07-12 | Resolved: 2018-08-28

## 2018-08-28 PROBLEM — O03.9 COMPLETE ABORTION: Status: RESOLVED | Noted: 2018-07-12 | Resolved: 2018-08-28

## 2018-08-28 LAB
BASOPHILS # BLD AUTO: 0.03 K/UL
BASOPHILS NFR BLD: 0.5 %
DIFFERENTIAL METHOD: ABNORMAL
EOSINOPHIL # BLD AUTO: 0.1 K/UL
EOSINOPHIL NFR BLD: 0.8 %
ERYTHROCYTE [DISTWIDTH] IN BLOOD BY AUTOMATED COUNT: 16.8 %
HCT VFR BLD AUTO: 40.4 %
HGB BLD-MCNC: 13.3 G/DL
LYMPHOCYTES # BLD AUTO: 2.3 K/UL
LYMPHOCYTES NFR BLD: 35.3 %
MCH RBC QN AUTO: 25.6 PG
MCHC RBC AUTO-ENTMCNC: 32.9 G/DL
MCV RBC AUTO: 78 FL
MONOCYTES # BLD AUTO: 0.5 K/UL
MONOCYTES NFR BLD: 7.8 %
NEUTROPHILS # BLD AUTO: 3.6 K/UL
NEUTROPHILS NFR BLD: 55.3 %
PLATELET # BLD AUTO: 254 K/UL
PMV BLD AUTO: 9.4 FL
RBC # BLD AUTO: 5.2 M/UL
WBC # BLD AUTO: 6.41 K/UL

## 2018-08-28 PROCEDURE — 99213 OFFICE O/P EST LOW 20 MIN: CPT | Mod: PBBFAC,PO | Performed by: OBSTETRICS & GYNECOLOGY

## 2018-08-28 PROCEDURE — 99214 OFFICE O/P EST MOD 30 MIN: CPT | Mod: 57,S$PBB,, | Performed by: OBSTETRICS & GYNECOLOGY

## 2018-08-28 PROCEDURE — 36415 COLL VENOUS BLD VENIPUNCTURE: CPT

## 2018-08-28 PROCEDURE — 99999 PR PBB SHADOW E&M-EST. PATIENT-LVL III: CPT | Mod: PBBFAC,,, | Performed by: OBSTETRICS & GYNECOLOGY

## 2018-08-28 PROCEDURE — 85025 COMPLETE CBC W/AUTO DIFF WBC: CPT

## 2018-08-28 RX ORDER — TRAZODONE HYDROCHLORIDE 50 MG/1
50 TABLET ORAL NIGHTLY
Qty: 30 TABLET | Refills: 2 | Status: SHIPPED | OUTPATIENT
Start: 2018-08-28 | End: 2018-10-23

## 2018-08-28 NOTE — PROGRESS NOTES
"CC:fibroids  Chief Complaint   Patient presents with    Follow-up       HPI:    42 y.o.   OB History      Para Term  AB Living    4 1 1 0 3      SAB TAB Ectopic Multiple Live Births    2       0        Complaining of: irreg vb and insomnia  Wants Hyst but wants to keep cx and Ovaries  Discussed risks of Morcellation with [facility]  Pt prefers to do SAH via [place]      (Not in a hospital admission)    Review of patient's allergies indicates:  No Known Allergies     History reviewed. No pertinent past medical history.  History reviewed. No pertinent surgical history.  History reviewed. No pertinent family history.  Social History     Tobacco Use    Smoking status: Never Smoker    Smokeless tobacco: Never Used   Substance Use Topics    Alcohol use: No    Drug use: No     ROS:  GENERAL: Feeling well overall. Denies fever or chills.   SKIN: Denies rash or lesions.   HEAD: Denies head injury or headache.   NODES: Denies enlarged lymph nodes.   CHEST: Denies chest pain or shortness of breath.   CARDIOVASCULAR: Denies palpitations or left sided chest pain.    ABDOMEN: Denies diarrhea, nausea, vomiting or rectal bleeding.   URINARY: No dysuria, hematuria, or burning on urination.  REPRODUCTIVE: See HPI.   BREASTS: Denies pain, lumps, or nipple discharge.   HEMATOLOGIC: No easy bruisability or excessive bleeding.   MUSCULOSKELETAL: Denies joint pain or swelling.   NEUROLOGIC: Denies syncope or weakness.   PSYCHIATRIC: Denies depression, anxiety or mood swings.      PE: BP (!) 140/90   Ht 5' 1" (1.549 m)   Wt 68.2 kg (150 lb 5.7 oz)   BMI 28.41 kg/m²      APPEARANCE: Well nourished, well developed, in no acute distress.  SKIN: Normal skin turgor, no lesions.  NECK: Neck symmetric without masses or thyromegaly.  NODES: No inguinal, cervical, axillary or femoral lymph node enlargement.  CARDIOVASCULAR: Normal S1, S2. No rubs, murmurs or gallops.  NEUROLOGIC: Normal mood and affect. No depression or " anxiety.   ABDOMEN: Soft. No tenderness or masses. No hepatosplenomegaly. No hernias.  RESPIRATORY: Normal respiratory effort with no retractions or use of accessory muscles.  BREASTS: Symmetrical, no skin changes or visible lesions. No palpable masses, nipple discharge, or adenopathy bilaterally.   BLADDER: Non-tender  GENITALIA: normal external genitalia, no erythema, no discharge  URETHRA: normal urethra, normal urethral meatus  VAGINA: Normal  CERVIX: no lesions or cervical motion tenderness  UTERUS: enlarged, 16 weeks size  ADNEXA: normal adnexa  Indication  ========  s/p ab Sat 06/09/2018 in [place].    Method  ======  Transabdominal ultrasound examination.    Pregnancy  =========  Esposito pregnancy. Number of fetuses: none.    Dating  ======  LMP on: 1/23/2018  Cycle: regular cycle  GA by LMP 20 w + 0 d  LUCIUS by LMP: 10/30/2018  Assigned: Dating performed on 03/27/2018, based on the LMP  Assigned GA 20 w + 0 d  Assigned LUCIUS: 10/30/2018    Assessment  ==========  Gestational sac: not visualized.  Yolk sac: not visualized.    Maternal Structures  ===============  Uterus / Cervix  Uterus: Abnormal  Uterus position: anteverted  Uterus long 15.9 cm  Uterus ap 8.4 cm  Uterus tr 13.4 cm  Uterus vol 940.4 cmÂ³  Endometrium: clearly visualized,  Endometrial thickness, total 34.1 mm  Fibroids: Fibroids identified  Findings: lt lat  D1 55.1 mm  D2 57.2 mm  D3 48.8 mm  Mean 53.7 mm  Vol 80.603 cmÂ³  Findings: ant  D1 33.4 mm  D2 41.6 mm  D3 32.0 mm  Mean 35.7 mm  Vol 23.227 cmÂ³  Findings: ant  D1 23.0 mm  D2 26.6 mm  D3 17.5 mm  Mean 22.4 mm  Vol 5.576 cmÂ³  Findings: post  D1 28.2 mm  D2 24.0 mm  D3 14.5 mm  Mean 22.2 mm  Vol 5.126 cmÂ³  Findings: post fund  D1 30.3 mm  D2 27.2 mm  D3 25.2 mm  Mean 27.6 mm  Vol 10.902 cmÂ³    Sonographer Comment  ==================  thick endometrium w/ complex appearance/ multiple fibroids.    Impression  =========  thick endometrium w/ complex appearance/ multiple fibroids.                                                       Sonographer: Rocío Kinsey  Electronically Signed by: Tank Moon M.D. at 06/18/2018-13:48  ASSESSMENT/ PLAN    Francie was seen today for follow-up.    Diagnoses and all orders for this visit:    Fibroids, submucosal    Encounter for surveillance of injectable contraceptive  -     CBC auto differential; Future    Adjustment insomnia    Other orders  -     traZODone (DESYREL) 50 MG tablet; Take 1 tablet (50 mg total) by mouth every evening.        [facility] at [facility] Moon MD

## 2018-08-29 ENCOUNTER — TELEPHONE (OUTPATIENT)
Dept: OBSTETRICS AND GYNECOLOGY | Facility: CLINIC | Age: 43
End: 2018-08-29

## 2018-08-29 NOTE — TELEPHONE ENCOUNTER
Pt stated that her medication was not in the pharmacy. Spoke to the pharmacist and she stated that the trazadone was ready. Pt notified and verbalized understanding.

## 2018-09-13 ENCOUNTER — TELEPHONE (OUTPATIENT)
Dept: ADMINISTRATIVE | Facility: OTHER | Age: 43
End: 2018-09-13

## 2018-09-13 DIAGNOSIS — D21.9 FIBROIDS: Primary | ICD-10-CM

## 2018-09-13 NOTE — TELEPHONE ENCOUNTER
----- Message from Tracey Henao MA sent at 8/28/2018  2:32 PM CDT -----  Rk Barber,    Can you please schedule Ms. Pillai for a Super Cervical Abdominal Hysterectomy in [place] (next available). Thanks!    Tracey

## 2018-10-23 ENCOUNTER — CLINICAL SUPPORT (OUTPATIENT)
Dept: OBSTETRICS AND GYNECOLOGY | Facility: CLINIC | Age: 43
End: 2018-10-23
Payer: MEDICAID

## 2018-10-23 ENCOUNTER — ANESTHESIA EVENT (OUTPATIENT)
Dept: SURGERY | Facility: HOSPITAL | Age: 43
DRG: 743 | End: 2018-10-23
Payer: MEDICAID

## 2018-10-23 ENCOUNTER — OFFICE VISIT (OUTPATIENT)
Dept: OBSTETRICS AND GYNECOLOGY | Facility: CLINIC | Age: 43
End: 2018-10-23
Payer: MEDICAID

## 2018-10-23 ENCOUNTER — HOSPITAL ENCOUNTER (OUTPATIENT)
Dept: PREADMISSION TESTING | Facility: HOSPITAL | Age: 43
Discharge: HOME | End: 2018-10-23
Attending: OBSTETRICS & GYNECOLOGY
Payer: MEDICAID

## 2018-10-23 VITALS
WEIGHT: 158.19 LBS | HEIGHT: 61 IN | BODY MASS INDEX: 29.86 KG/M2 | DIASTOLIC BLOOD PRESSURE: 84 MMHG | SYSTOLIC BLOOD PRESSURE: 130 MMHG

## 2018-10-23 DIAGNOSIS — D21.9 FIBROIDS: Primary | ICD-10-CM

## 2018-10-23 DIAGNOSIS — D21.9 FIBROIDS: ICD-10-CM

## 2018-10-23 DIAGNOSIS — Z30.42 ENCOUNTER FOR DEPO-PROVERA CONTRACEPTION: Primary | ICD-10-CM

## 2018-10-23 PROCEDURE — 99213 OFFICE O/P EST LOW 20 MIN: CPT | Mod: PBBFAC,PO | Performed by: OBSTETRICS & GYNECOLOGY

## 2018-10-23 PROCEDURE — 99212 OFFICE O/P EST SF 10 MIN: CPT | Mod: PBBFAC,27,PO,25

## 2018-10-23 PROCEDURE — 99499 UNLISTED E&M SERVICE: CPT | Mod: S$PBB,,, | Performed by: OBSTETRICS & GYNECOLOGY

## 2018-10-23 PROCEDURE — 99999 PR PBB SHADOW E&M-EST. PATIENT-LVL II: CPT | Mod: PBBFAC,,,

## 2018-10-23 PROCEDURE — 96372 THER/PROPH/DIAG INJ SC/IM: CPT | Mod: PBBFAC,PO

## 2018-10-23 PROCEDURE — 99999 PR PBB SHADOW E&M-EST. PATIENT-LVL III: CPT | Mod: PBBFAC,,, | Performed by: OBSTETRICS & GYNECOLOGY

## 2018-10-23 RX ORDER — LIDOCAINE HYDROCHLORIDE 10 MG/ML
1 INJECTION, SOLUTION EPIDURAL; INFILTRATION; INTRACAUDAL; PERINEURAL ONCE
Status: CANCELLED | OUTPATIENT
Start: 2018-10-23 | End: 2018-10-23

## 2018-10-23 RX ORDER — SODIUM CHLORIDE, SODIUM LACTATE, POTASSIUM CHLORIDE, CALCIUM CHLORIDE 600; 310; 30; 20 MG/100ML; MG/100ML; MG/100ML; MG/100ML
INJECTION, SOLUTION INTRAVENOUS CONTINUOUS
Status: CANCELLED | OUTPATIENT
Start: 2018-10-23

## 2018-10-23 RX ADMIN — MEDROXYPROGESTERONE ACETATE 150 MG: 150 INJECTION, SUSPENSION INTRAMUSCULAR at 01:10

## 2018-10-23 NOTE — H&P
"CC:fibroids      Chief Complaint   Patient presents with    Follow-up         HPI:     42 y.o.            OB History       Para Term  AB Living     4 1 1 0 3       SAB TAB Ectopic Multiple Live Births     2       0         Complaining of: irreg vb and insomnia  Wants Hyst but wants to keep cx and Ovaries  Discussed risks of Morcellation with [facility]  Pt prefers to do SAH via [facility]        (Not in a hospital admission)     Review of patient's allergies indicates:  No Known Allergies      History reviewed. No pertinent past medical history.  History reviewed. No pertinent surgical history.  History reviewed. No pertinent family history.  Social History           Tobacco Use    Smoking status: Never Smoker    Smokeless tobacco: Never Used   Substance Use Topics    Alcohol use: No    Drug use: No      ROS:  GENERAL: Feeling well overall. Denies fever or chills.   SKIN: Denies rash or lesions.   HEAD: Denies head injury or headache.   NODES: Denies enlarged lymph nodes.   CHEST: Denies chest pain or shortness of breath.   CARDIOVASCULAR: Denies palpitations or left sided chest pain.    ABDOMEN: Denies diarrhea, nausea, vomiting or rectal bleeding.   URINARY: No dysuria, hematuria, or burning on urination.  REPRODUCTIVE: See HPI.   BREASTS: Denies pain, lumps, or nipple discharge.   HEMATOLOGIC: No easy bruisability or excessive bleeding.   MUSCULOSKELETAL: Denies joint pain or swelling.   NEUROLOGIC: Denies syncope or weakness.   PSYCHIATRIC: Denies depression, anxiety or mood swings.        PE: BP (!) 140/90   Ht 5' 1" (1.549 m)   Wt 68.2 kg (150 lb 5.7 oz)   BMI 28.41 kg/m²       APPEARANCE: Well nourished, well developed, in no acute distress.  SKIN: Normal skin turgor, no lesions.  NECK: Neck symmetric without masses or thyromegaly.  NODES: No inguinal, cervical, axillary or femoral lymph node enlargement.  CARDIOVASCULAR: Normal S1, S2. No rubs, murmurs or gallops.  NEUROLOGIC: Normal " mood and affect. No depression or anxiety.   ABDOMEN: Soft. No tenderness or masses. No hepatosplenomegaly. No hernias.  RESPIRATORY: Normal respiratory effort with no retractions or use of accessory muscles.  BREASTS: Symmetrical, no skin changes or visible lesions. No palpable masses, nipple discharge, or adenopathy bilaterally.   BLADDER: Non-tender  GENITALIA: normal external genitalia, no erythema, no discharge  URETHRA: normal urethra, normal urethral meatus  VAGINA: Normal  CERVIX: no lesions or cervical motion tenderness  UTERUS: enlarged, 16 weeks size  ADNEXA: normal adnexa  Indication  ========  s/p ab Sat 06/09/2018 in [place].    Method  ======  Transabdominal ultrasound examination.    Pregnancy  =========  Esposito pregnancy. Number of fetuses: none.    Dating  ======  LMP on: 1/23/2018  Cycle: regular cycle  GA by LMP 20 w + 0 d  LUCIUS by LMP: 10/30/2018  Assigned: Dating performed on 03/27/2018, based on the LMP  Assigned GA 20 w + 0 d  Assigned LUCIUS: 10/30/2018    Assessment  ==========  Gestational sac: not visualized.  Yolk sac: not visualized.    Maternal Structures  ===============  Uterus / Cervix  Uterus: Abnormal  Uterus position: anteverted  Uterus long 15.9 cm  Uterus ap 8.4 cm  Uterus tr 13.4 cm  Uterus vol 940.4 cmÂ³  Endometrium: clearly visualized,  Endometrial thickness, total 34.1 mm  Fibroids: Fibroids identified  Findings: lt lat  D1 55.1 mm  D2 57.2 mm  D3 48.8 mm  Mean 53.7 mm  Vol 80.603 cmÂ³  Findings: ant  D1 33.4 mm  D2 41.6 mm  D3 32.0 mm  Mean 35.7 mm  Vol 23.227 cmÂ³  Findings: ant  D1 23.0 mm  D2 26.6 mm  D3 17.5 mm  Mean 22.4 mm  Vol 5.576 cmÂ³  Findings: post  D1 28.2 mm  D2 24.0 mm  D3 14.5 mm  Mean 22.2 mm  Vol 5.126 cmÂ³  Findings: post fund  D1 30.3 mm  D2 27.2 mm  D3 25.2 mm  Mean 27.6 mm  Vol 10.902 cmÂ³    Sonographer Comment  ==================  thick endometrium w/ complex appearance/ multiple fibroids.    Impression  =========  thick endometrium w/ complex  appearance/ multiple fibroids.                                                      Sonographer: Rocío Kinsey  Electronically Signed by: Tank Moon M.D. at 06/18/2018-13:48  ASSESSMENT/ PLAN     Francie was seen today for follow-up.     Diagnoses and all orders for this visit:     Fibroids, submucosal     Encounter for surveillance of injectable contraceptive  -     CBC auto differential; Future     Adjustment insomnia     Other orders  -     traZODone (DESYREL) 50 MG tablet; Take 1 tablet (50 mg total) by mouth every evening.           [facility] at [facility] Moon MD

## 2018-10-23 NOTE — PRE-PROCEDURE INSTRUCTIONS
Arringing ride home at this time.    Allergies, medical, surgical, family and psychosocial histories reviewed with patient. Periop plan of care reviewed. Preop instructions given, including medications to take and to hold. Hibiclens soap and instructions on use given. Time allotted for questions to be addressed.  Patient verbalized understanding.

## 2018-10-23 NOTE — H&P (VIEW-ONLY)
"CC:fibroids      Chief Complaint   Patient presents with    Follow-up         HPI:     42 y.o.            OB History       Para Term  AB Living     4 1 1 0 3       SAB TAB Ectopic Multiple Live Births     2       0         Complaining of: irreg vb and insomnia  Wants Hyst but wants to keep cx and Ovaries  Discussed risks of Morcellation with [facility]  Pt prefers to do SAH via [facility]        (Not in a hospital admission)     Review of patient's allergies indicates:  No Known Allergies      History reviewed. No pertinent past medical history.  History reviewed. No pertinent surgical history.  History reviewed. No pertinent family history.  Social History           Tobacco Use    Smoking status: Never Smoker    Smokeless tobacco: Never Used   Substance Use Topics    Alcohol use: No    Drug use: No      ROS:  GENERAL: Feeling well overall. Denies fever or chills.   SKIN: Denies rash or lesions.   HEAD: Denies head injury or headache.   NODES: Denies enlarged lymph nodes.   CHEST: Denies chest pain or shortness of breath.   CARDIOVASCULAR: Denies palpitations or left sided chest pain.    ABDOMEN: Denies diarrhea, nausea, vomiting or rectal bleeding.   URINARY: No dysuria, hematuria, or burning on urination.  REPRODUCTIVE: See HPI.   BREASTS: Denies pain, lumps, or nipple discharge.   HEMATOLOGIC: No easy bruisability or excessive bleeding.   MUSCULOSKELETAL: Denies joint pain or swelling.   NEUROLOGIC: Denies syncope or weakness.   PSYCHIATRIC: Denies depression, anxiety or mood swings.        PE: BP (!) 140/90   Ht 5' 1" (1.549 m)   Wt 68.2 kg (150 lb 5.7 oz)   BMI 28.41 kg/m²       APPEARANCE: Well nourished, well developed, in no acute distress.  SKIN: Normal skin turgor, no lesions.  NECK: Neck symmetric without masses or thyromegaly.  NODES: No inguinal, cervical, axillary or femoral lymph node enlargement.  CARDIOVASCULAR: Normal S1, S2. No rubs, murmurs or gallops.  NEUROLOGIC: Normal " mood and affect. No depression or anxiety.   ABDOMEN: Soft. No tenderness or masses. No hepatosplenomegaly. No hernias.  RESPIRATORY: Normal respiratory effort with no retractions or use of accessory muscles.  BREASTS: Symmetrical, no skin changes or visible lesions. No palpable masses, nipple discharge, or adenopathy bilaterally.   BLADDER: Non-tender  GENITALIA: normal external genitalia, no erythema, no discharge  URETHRA: normal urethra, normal urethral meatus  VAGINA: Normal  CERVIX: no lesions or cervical motion tenderness  UTERUS: enlarged, 16 weeks size  ADNEXA: normal adnexa  Indication  ========  s/p ab Sat 06/09/2018 in [place].    Method  ======  Transabdominal ultrasound examination.    Pregnancy  =========  Esposito pregnancy. Number of fetuses: none.    Dating  ======  LMP on: 1/23/2018  Cycle: regular cycle  GA by LMP 20 w + 0 d  LUCIUS by LMP: 10/30/2018  Assigned: Dating performed on 03/27/2018, based on the LMP  Assigned GA 20 w + 0 d  Assigned LUCIUS: 10/30/2018    Assessment  ==========  Gestational sac: not visualized.  Yolk sac: not visualized.    Maternal Structures  ===============  Uterus / Cervix  Uterus: Abnormal  Uterus position: anteverted  Uterus long 15.9 cm  Uterus ap 8.4 cm  Uterus tr 13.4 cm  Uterus vol 940.4 cmÂ³  Endometrium: clearly visualized,  Endometrial thickness, total 34.1 mm  Fibroids: Fibroids identified  Findings: lt lat  D1 55.1 mm  D2 57.2 mm  D3 48.8 mm  Mean 53.7 mm  Vol 80.603 cmÂ³  Findings: ant  D1 33.4 mm  D2 41.6 mm  D3 32.0 mm  Mean 35.7 mm  Vol 23.227 cmÂ³  Findings: ant  D1 23.0 mm  D2 26.6 mm  D3 17.5 mm  Mean 22.4 mm  Vol 5.576 cmÂ³  Findings: post  D1 28.2 mm  D2 24.0 mm  D3 14.5 mm  Mean 22.2 mm  Vol 5.126 cmÂ³  Findings: post fund  D1 30.3 mm  D2 27.2 mm  D3 25.2 mm  Mean 27.6 mm  Vol 10.902 cmÂ³    Sonographer Comment  ==================  thick endometrium w/ complex appearance/ multiple fibroids.    Impression  =========  thick endometrium w/ complex  appearance/ multiple fibroids.                                                      Sonographer: Rocío Kinsey  Electronically Signed by: Tank Moon M.D. at 06/18/2018-13:48  ASSESSMENT/ PLAN     Francie was seen today for follow-up.     Diagnoses and all orders for this visit:     Fibroids, submucosal     Encounter for surveillance of injectable contraceptive  -     CBC auto differential; Future     Adjustment insomnia     Other orders  -     traZODone (DESYREL) 50 MG tablet; Take 1 tablet (50 mg total) by mouth every evening.           [facility] Moon MD

## 2018-10-23 NOTE — ANESTHESIA PREPROCEDURE EVALUATION
10/23/2018  Francie Redding is a 43 y.o., female with abnormal uterine bleeding here for hysterectomy.  Bulgarian speaking.      Anesthesia Evaluation    I have reviewed the Patient Summary Reports.    I have reviewed the Nursing Notes.   I have reviewed the Medications.     Review of Systems  Anesthesia Hx:  No problems with previous Anesthesia    Social:  Non-Smoker    Hematology/Oncology:  Hematology Normal        Cardiovascular:  Cardiovascular Normal Exercise tolerance: good     Pulmonary:  Pulmonary Normal    Hepatic/GI:  Hepatic/GI Normal    Musculoskeletal:  Musculoskeletal Normal    OB/GYN/PEDS:  Fibroids, AUB   Neurological:  Neurology Normal        Physical Exam  General:  Well nourished    Airway/Jaw/Neck:  Airway Findings: Mouth Opening: Normal Tongue: Normal  General Airway Assessment: Adult  Mallampati: IV  TM Distance: 4 - 6 cm  Jaw/Neck Findings:  Neck ROM: Extension Decreased, Mild      Dental:  Dental Findings: In tact   Chest/Lungs:  Chest/Lungs Findings: Clear to auscultation, Normal Respiratory Rate     Heart/Vascular:  Heart Findings: Rate: Normal  Rhythm: Regular Rhythm  Sounds: Normal        Mental Status:  Mental Status Findings:  Cooperative, Alert and Oriented       CBC, BMP, and Type and Screen pending.    Anesthesia Plan  Type of Anesthesia, risks & benefits discussed:  Anesthesia Type:  general  Patient's Preference:   Intra-op Monitoring Plan: standard ASA monitors  Intra-op Monitoring Plan Comments:   Post Op Pain Control Plan: multimodal analgesia  Post Op Pain Control Plan Comments:   Induction:   IV  Beta Blocker:  Patient is not currently on a Beta-Blocker (No further documentation required).       Informed Consent: Patient understands risks and agrees with Anesthesia plan.  Questions answered. Anesthesia consent signed with patient.  ASA Score: 2     Day of Surgery  Review of History & Physical:    H&P update referred to the surgeon.         Ready For Surgery From Anesthesia Perspective.     Consent signed with .

## 2018-10-23 NOTE — DISCHARGE INSTRUCTIONS
Instrucciones para el Paciente:     Por favor presentarse el 10/30/18 a las 5:30am   Reportarse en el ellen piso Procedural Check In Room.   No coma ni shameka nada después de medianoche (12:00 AM) la noche antes de herrera cirugía.   Por favor dejar todas anca joyas y objetos de valor en casa   Use ropa floja y cómoda    No podrá manejar después de herrera procedimiento. Por favor davin arreglos de transportación para que la traigan y lleven a casa.   Puede bañarse la noche anterior y por la mañana el día de herrera procedimiento. (Puede usar jabón antimicrobial o el jabón Dial amarillo.    No use cremas humectantes, perfumes o talcos.   No tome Aspirina, Ibuoprofin, Advil, Motrin, Aleve, Nuprin, o Naprosyn 3-5 días antes de herrera procedimiento. Puede keith Tylenol o Acetaminofen para el dolor.    Puede llamar al [phone] si tiene alguna pregunta.      Instrucciones Pre-Operativas Para Banarse    Usted juega un papel muy importante en herrera jessica antes de herrera cirugia.  Perry herrera piel no esta esterilizada, necesitamos asegurarnos que se encuentre lo mas limpia y sin germenes que sea posible.  Al seguir estas instrucciones usted podra reducir el shari de germenes en la piel antes de herrera cirugia.    IMPORTANTE: Necesitara banarse con un jabon especial llamado *Hibiclens, que lo encontrara en cualquier farmacia.  Si usted es alergico a Clorhexidine ( el anticeptico que se encuentra en Hibiclens), use un jabon antibacterial perry Dial para banarse.  Debera banarse con Hibiclens la noche antes de la cirugia y la manana del john de herrera cirugia. No use el Hibiclens en herrera lisa, la lucio o anca partes genitales para evitar lesiones en esas areas.    La noche antes de herrera cirugia:    1. No afeite la parte de herrera cuerpo donde se dick la cirugia.  2. Banese y lave herrera paula normalmente con herrera jabon y shampu.  3. Enjuage john herrera paula y herrera cuerpo para remover residuos del jabon.  4. Con herrera mano, aplique un paquetito de Hibiclens al area  donde se dick la cirugia.  5. Lave el area suavemente por akin (5) minutos.  No estregue herrera piel muy thomas.  6. No se lave con jabon regular despues de usar el Hibiclens.  7. Enjuage herrera cuerpo muy john.  8. Sequese con pierre toalla limpia y suave.  9. No use locion, cremas o polvos/talcos.  10. Use ropa limpia.      La manana del john de herrera cirujia:           1.Repita el proceso anterior        *No usar si usted es alergico a Chlorhexidine

## 2018-10-23 NOTE — PROGRESS NOTES
Administered Depo Provera 150 mg/ml @ 1341 10/23/2018  1 ml IM inj in R upper quad gluteus  Patient Tolerated well.  Patient instructed to return on 01/14/19 for next injection.   Patient verbalized understanding.   Lot:[id number]  Exp: 03/20  Reviewed patient pain scale, allergies, medications, preffered pharmacy, fall risk, and advance directives verbally prior to injection.     Date of last pap/visit:3/26/2018  Last Depo-Provera: 7/24/2018  UPT indicated?no   Ordering Provider: Dr. Moon

## 2018-10-30 ENCOUNTER — HOSPITAL ENCOUNTER (INPATIENT)
Facility: HOSPITAL | Age: 43
LOS: 2 days | Discharge: HOME | DRG: 743 | End: 2018-11-01
Attending: OBSTETRICS & GYNECOLOGY | Admitting: OBSTETRICS & GYNECOLOGY
Payer: MEDICAID

## 2018-10-30 ENCOUNTER — ANESTHESIA (OUTPATIENT)
Dept: SURGERY | Facility: HOSPITAL | Age: 43
DRG: 743 | End: 2018-10-30
Payer: MEDICAID

## 2018-10-30 VITALS — DIASTOLIC BLOOD PRESSURE: 58 MMHG | HEART RATE: 95 BPM | OXYGEN SATURATION: 99 % | SYSTOLIC BLOOD PRESSURE: 129 MMHG

## 2018-10-30 DIAGNOSIS — D21.9 FIBROIDS: ICD-10-CM

## 2018-10-30 LAB
ANION GAP SERPL CALC-SCNC: 7 MMOL/L
B-HCG UR QL: NEGATIVE
BUN SERPL-MCNC: 10 MG/DL
CALCIUM SERPL-MCNC: 9.2 MG/DL
CHLORIDE SERPL-SCNC: 105 MMOL/L
CO2 SERPL-SCNC: 24 MMOL/L
CREAT SERPL-MCNC: 0.7 MG/DL
CTP QC/QA: YES
EST. GFR  (AFRICAN AMERICAN): >60 ML/MIN/1.73 M^2
EST. GFR  (NON AFRICAN AMERICAN): >60 ML/MIN/1.73 M^2
GLUCOSE SERPL-MCNC: 135 MG/DL
POCT GLUCOSE: 129 MG/DL (ref 70–110)
POTASSIUM SERPL-SCNC: 4 MMOL/L
SODIUM SERPL-SCNC: 136 MMOL/L

## 2018-10-30 PROCEDURE — 63600175 PHARM REV CODE 636 W HCPCS: Performed by: ANESTHESIOLOGY

## 2018-10-30 PROCEDURE — 0UT90ZL RESECTION OF UTERUS, SUPRACERVICAL, OPEN APPROACH: ICD-10-PCS | Performed by: OBSTETRICS & GYNECOLOGY

## 2018-10-30 PROCEDURE — 37000008 HC ANESTHESIA 1ST 15 MINUTES: Performed by: OBSTETRICS & GYNECOLOGY

## 2018-10-30 PROCEDURE — 63600175 PHARM REV CODE 636 W HCPCS: Performed by: OBSTETRICS & GYNECOLOGY

## 2018-10-30 PROCEDURE — 58180 PARTIAL HYSTERECTOMY: CPT | Mod: 80,,, | Performed by: OBSTETRICS & GYNECOLOGY

## 2018-10-30 PROCEDURE — 36000709 HC OR TIME LEV III EA ADD 15 MIN: Performed by: OBSTETRICS & GYNECOLOGY

## 2018-10-30 PROCEDURE — 36415 COLL VENOUS BLD VENIPUNCTURE: CPT

## 2018-10-30 PROCEDURE — 37000009 HC ANESTHESIA EA ADD 15 MINS: Performed by: OBSTETRICS & GYNECOLOGY

## 2018-10-30 PROCEDURE — 80048 BASIC METABOLIC PNL TOTAL CA: CPT

## 2018-10-30 PROCEDURE — 25000003 PHARM REV CODE 250: Performed by: OBSTETRICS & GYNECOLOGY

## 2018-10-30 PROCEDURE — 88307 TISSUE EXAM BY PATHOLOGIST: CPT | Performed by: PATHOLOGY

## 2018-10-30 PROCEDURE — 0UT70ZZ RESECTION OF BILATERAL FALLOPIAN TUBES, OPEN APPROACH: ICD-10-PCS | Performed by: OBSTETRICS & GYNECOLOGY

## 2018-10-30 PROCEDURE — 27201423 OPTIME MED/SURG SUP & DEVICES STERILE SUPPLY: Performed by: OBSTETRICS & GYNECOLOGY

## 2018-10-30 PROCEDURE — 94770 HC EXHALED C02 TEST: CPT

## 2018-10-30 PROCEDURE — 25000003 PHARM REV CODE 250: Performed by: NURSE ANESTHETIST, CERTIFIED REGISTERED

## 2018-10-30 PROCEDURE — 63600175 PHARM REV CODE 636 W HCPCS: Performed by: NURSE ANESTHETIST, CERTIFIED REGISTERED

## 2018-10-30 PROCEDURE — 36000708 HC OR TIME LEV III 1ST 15 MIN: Performed by: OBSTETRICS & GYNECOLOGY

## 2018-10-30 PROCEDURE — 99900035 HC TECH TIME PER 15 MIN (STAT)

## 2018-10-30 PROCEDURE — 88307 TISSUE EXAM BY PATHOLOGIST: CPT | Mod: 26,,, | Performed by: PATHOLOGY

## 2018-10-30 PROCEDURE — 71000033 HC RECOVERY, INTIAL HOUR: Performed by: OBSTETRICS & GYNECOLOGY

## 2018-10-30 PROCEDURE — 94761 N-INVAS EAR/PLS OXIMETRY MLT: CPT

## 2018-10-30 PROCEDURE — 58180 PARTIAL HYSTERECTOMY: CPT | Mod: ,,, | Performed by: OBSTETRICS & GYNECOLOGY

## 2018-10-30 PROCEDURE — 81025 URINE PREGNANCY TEST: CPT | Performed by: OBSTETRICS & GYNECOLOGY

## 2018-10-30 PROCEDURE — 11000001 HC ACUTE MED/SURG PRIVATE ROOM

## 2018-10-30 PROCEDURE — 71000039 HC RECOVERY, EACH ADD'L HOUR: Performed by: OBSTETRICS & GYNECOLOGY

## 2018-10-30 RX ORDER — KETOROLAC TROMETHAMINE 30 MG/ML
30 INJECTION, SOLUTION INTRAMUSCULAR; INTRAVENOUS EVERY 6 HOURS
Status: DISPENSED | OUTPATIENT
Start: 2018-10-30 | End: 2018-10-31

## 2018-10-30 RX ORDER — IBUPROFEN 600 MG/1
600 TABLET ORAL EVERY 6 HOURS
Status: DISCONTINUED | OUTPATIENT
Start: 2018-10-31 | End: 2018-11-01 | Stop reason: HOSPADM

## 2018-10-30 RX ORDER — AMOXICILLIN 250 MG
1 CAPSULE ORAL 2 TIMES DAILY
Status: DISCONTINUED | OUTPATIENT
Start: 2018-10-30 | End: 2018-11-01 | Stop reason: HOSPADM

## 2018-10-30 RX ORDER — SUCCINYLCHOLINE CHLORIDE 20 MG/ML
INJECTION INTRAMUSCULAR; INTRAVENOUS
Status: DISCONTINUED | OUTPATIENT
Start: 2018-10-30 | End: 2018-10-30

## 2018-10-30 RX ORDER — HYDROMORPHONE HCL IN 0.9% NACL 6 MG/30 ML
PATIENT CONTROLLED ANALGESIA SYRINGE INTRAVENOUS CONTINUOUS
Status: DISCONTINUED | OUTPATIENT
Start: 2018-10-30 | End: 2018-10-31

## 2018-10-30 RX ORDER — PANTOPRAZOLE SODIUM 40 MG/10ML
40 INJECTION, POWDER, LYOPHILIZED, FOR SOLUTION INTRAVENOUS
Status: DISCONTINUED | OUTPATIENT
Start: 2018-10-31 | End: 2018-11-01 | Stop reason: HOSPADM

## 2018-10-30 RX ORDER — MEPERIDINE HYDROCHLORIDE 50 MG/ML
12.5 INJECTION INTRAMUSCULAR; INTRAVENOUS; SUBCUTANEOUS ONCE AS NEEDED
Status: CANCELLED | OUTPATIENT
Start: 2018-10-30 | End: 2018-10-30

## 2018-10-30 RX ORDER — MEPERIDINE HYDROCHLORIDE 50 MG/ML
12.5 INJECTION INTRAMUSCULAR; INTRAVENOUS; SUBCUTANEOUS ONCE AS NEEDED
Status: DISCONTINUED | OUTPATIENT
Start: 2018-10-30 | End: 2018-10-30

## 2018-10-30 RX ORDER — GLYCOPYRROLATE 0.2 MG/ML
INJECTION INTRAMUSCULAR; INTRAVENOUS
Status: DISCONTINUED | OUTPATIENT
Start: 2018-10-30 | End: 2018-10-30

## 2018-10-30 RX ORDER — ONDANSETRON 8 MG/1
8 TABLET, ORALLY DISINTEGRATING ORAL EVERY 8 HOURS PRN
Status: DISCONTINUED | OUTPATIENT
Start: 2018-10-30 | End: 2018-11-01 | Stop reason: HOSPADM

## 2018-10-30 RX ORDER — OXYCODONE AND ACETAMINOPHEN 5; 325 MG/1; MG/1
1 TABLET ORAL EVERY 4 HOURS PRN
Status: DISCONTINUED | OUTPATIENT
Start: 2018-10-30 | End: 2018-11-01 | Stop reason: HOSPADM

## 2018-10-30 RX ORDER — ONDANSETRON 2 MG/ML
4 INJECTION INTRAMUSCULAR; INTRAVENOUS DAILY PRN
Status: DISCONTINUED | OUTPATIENT
Start: 2018-10-30 | End: 2018-10-30 | Stop reason: SDUPTHER

## 2018-10-30 RX ORDER — FENTANYL CITRATE 50 UG/ML
INJECTION, SOLUTION INTRAMUSCULAR; INTRAVENOUS
Status: DISCONTINUED | OUTPATIENT
Start: 2018-10-30 | End: 2018-10-30

## 2018-10-30 RX ORDER — DIPHENHYDRAMINE HCL 25 MG
25 CAPSULE ORAL EVERY 4 HOURS PRN
Status: DISCONTINUED | OUTPATIENT
Start: 2018-10-30 | End: 2018-11-01 | Stop reason: HOSPADM

## 2018-10-30 RX ORDER — DEXAMETHASONE SODIUM PHOSPHATE 4 MG/ML
INJECTION, SOLUTION INTRA-ARTICULAR; INTRALESIONAL; INTRAMUSCULAR; INTRAVENOUS; SOFT TISSUE
Status: DISCONTINUED | OUTPATIENT
Start: 2018-10-30 | End: 2018-10-30

## 2018-10-30 RX ORDER — LIDOCAINE HYDROCHLORIDE 10 MG/ML
1 INJECTION, SOLUTION EPIDURAL; INFILTRATION; INTRACAUDAL; PERINEURAL ONCE
Status: DISCONTINUED | OUTPATIENT
Start: 2018-10-30 | End: 2018-10-30 | Stop reason: HOSPADM

## 2018-10-30 RX ORDER — ONDANSETRON 2 MG/ML
4 INJECTION INTRAMUSCULAR; INTRAVENOUS DAILY PRN
Status: CANCELLED | OUTPATIENT
Start: 2018-10-30

## 2018-10-30 RX ORDER — MEPERIDINE HYDROCHLORIDE 50 MG/ML
12.5 INJECTION INTRAMUSCULAR; INTRAVENOUS; SUBCUTANEOUS ONCE AS NEEDED
Status: DISCONTINUED | OUTPATIENT
Start: 2018-10-30 | End: 2018-10-30 | Stop reason: SDUPTHER

## 2018-10-30 RX ORDER — NALOXONE HCL 0.4 MG/ML
0.02 VIAL (ML) INJECTION
Status: DISCONTINUED | OUTPATIENT
Start: 2018-10-30 | End: 2018-11-01 | Stop reason: HOSPADM

## 2018-10-30 RX ORDER — OXYCODONE AND ACETAMINOPHEN 10; 325 MG/1; MG/1
1 TABLET ORAL EVERY 4 HOURS PRN
Status: DISCONTINUED | OUTPATIENT
Start: 2018-10-30 | End: 2018-11-01 | Stop reason: HOSPADM

## 2018-10-30 RX ORDER — HYDROMORPHONE HYDROCHLORIDE 2 MG/ML
0.5 INJECTION, SOLUTION INTRAMUSCULAR; INTRAVENOUS; SUBCUTANEOUS EVERY 5 MIN PRN
Status: CANCELLED | OUTPATIENT
Start: 2018-10-30

## 2018-10-30 RX ORDER — MIDAZOLAM HYDROCHLORIDE 1 MG/ML
INJECTION INTRAMUSCULAR; INTRAVENOUS
Status: DISCONTINUED | OUTPATIENT
Start: 2018-10-30 | End: 2018-10-30

## 2018-10-30 RX ORDER — ONDANSETRON 2 MG/ML
4 INJECTION INTRAMUSCULAR; INTRAVENOUS DAILY PRN
Status: DISCONTINUED | OUTPATIENT
Start: 2018-10-30 | End: 2018-10-30 | Stop reason: HOSPADM

## 2018-10-30 RX ORDER — CEFAZOLIN SODIUM 1 G/50ML
2 SOLUTION INTRAVENOUS
Status: DISCONTINUED | OUTPATIENT
Start: 2018-10-30 | End: 2018-10-30 | Stop reason: HOSPADM

## 2018-10-30 RX ORDER — ONDANSETRON HYDROCHLORIDE 2 MG/ML
INJECTION, SOLUTION INTRAMUSCULAR; INTRAVENOUS
Status: DISCONTINUED | OUTPATIENT
Start: 2018-10-30 | End: 2018-10-30

## 2018-10-30 RX ORDER — HYDROMORPHONE HYDROCHLORIDE 2 MG/ML
0.5 INJECTION, SOLUTION INTRAMUSCULAR; INTRAVENOUS; SUBCUTANEOUS EVERY 5 MIN PRN
Status: DISCONTINUED | OUTPATIENT
Start: 2018-10-30 | End: 2018-10-30 | Stop reason: SDUPTHER

## 2018-10-30 RX ORDER — KETOROLAC TROMETHAMINE 30 MG/ML
INJECTION, SOLUTION INTRAMUSCULAR; INTRAVENOUS
Status: DISCONTINUED | OUTPATIENT
Start: 2018-10-30 | End: 2018-10-30

## 2018-10-30 RX ORDER — SODIUM CHLORIDE 0.9 % (FLUSH) 0.9 %
3 SYRINGE (ML) INJECTION
Status: DISCONTINUED | OUTPATIENT
Start: 2018-10-30 | End: 2018-10-30

## 2018-10-30 RX ORDER — LIDOCAINE HCL/PF 100 MG/5ML
SYRINGE (ML) INTRAVENOUS
Status: DISCONTINUED | OUTPATIENT
Start: 2018-10-30 | End: 2018-10-30

## 2018-10-30 RX ORDER — ACETAMINOPHEN 10 MG/ML
INJECTION, SOLUTION INTRAVENOUS
Status: DISCONTINUED | OUTPATIENT
Start: 2018-10-30 | End: 2018-10-30

## 2018-10-30 RX ORDER — HYDROMORPHONE HYDROCHLORIDE 2 MG/ML
0.5 INJECTION, SOLUTION INTRAMUSCULAR; INTRAVENOUS; SUBCUTANEOUS EVERY 5 MIN PRN
Status: DISCONTINUED | OUTPATIENT
Start: 2018-10-30 | End: 2018-10-30

## 2018-10-30 RX ORDER — SODIUM CHLORIDE 0.9 % (FLUSH) 0.9 %
3 SYRINGE (ML) INJECTION
Status: CANCELLED | OUTPATIENT
Start: 2018-10-30

## 2018-10-30 RX ORDER — ROCURONIUM BROMIDE 10 MG/ML
INJECTION, SOLUTION INTRAVENOUS
Status: DISCONTINUED | OUTPATIENT
Start: 2018-10-30 | End: 2018-10-30

## 2018-10-30 RX ORDER — NEOSTIGMINE METHYLSULFATE 1 MG/ML
INJECTION, SOLUTION INTRAVENOUS
Status: DISCONTINUED | OUTPATIENT
Start: 2018-10-30 | End: 2018-10-30

## 2018-10-30 RX ORDER — SODIUM CHLORIDE, SODIUM LACTATE, POTASSIUM CHLORIDE, CALCIUM CHLORIDE 600; 310; 30; 20 MG/100ML; MG/100ML; MG/100ML; MG/100ML
INJECTION, SOLUTION INTRAVENOUS CONTINUOUS
Status: DISCONTINUED | OUTPATIENT
Start: 2018-10-30 | End: 2018-10-31

## 2018-10-30 RX ORDER — BISACODYL 10 MG
10 SUPPOSITORY, RECTAL RECTAL DAILY PRN
Status: DISCONTINUED | OUTPATIENT
Start: 2018-10-30 | End: 2018-11-01 | Stop reason: HOSPADM

## 2018-10-30 RX ORDER — SODIUM CHLORIDE, SODIUM LACTATE, POTASSIUM CHLORIDE, CALCIUM CHLORIDE 600; 310; 30; 20 MG/100ML; MG/100ML; MG/100ML; MG/100ML
INJECTION, SOLUTION INTRAVENOUS CONTINUOUS PRN
Status: DISCONTINUED | OUTPATIENT
Start: 2018-10-30 | End: 2018-10-30

## 2018-10-30 RX ORDER — SODIUM CHLORIDE 0.9 % (FLUSH) 0.9 %
3 SYRINGE (ML) INJECTION
Status: DISCONTINUED | OUTPATIENT
Start: 2018-10-30 | End: 2018-10-30 | Stop reason: SDUPTHER

## 2018-10-30 RX ORDER — PROPOFOL 10 MG/ML
VIAL (ML) INTRAVENOUS
Status: DISCONTINUED | OUTPATIENT
Start: 2018-10-30 | End: 2018-10-30

## 2018-10-30 RX ORDER — HYDROMORPHONE HYDROCHLORIDE 2 MG/ML
0.5 INJECTION, SOLUTION INTRAMUSCULAR; INTRAVENOUS; SUBCUTANEOUS EVERY 5 MIN PRN
Status: DISCONTINUED | OUTPATIENT
Start: 2018-10-30 | End: 2018-10-30 | Stop reason: HOSPADM

## 2018-10-30 RX ADMIN — PROPOFOL 130 MG: 10 INJECTION, EMULSION INTRAVENOUS at 07:10

## 2018-10-30 RX ADMIN — DEXTROSE 2 G: 50 INJECTION, SOLUTION INTRAVENOUS at 07:10

## 2018-10-30 RX ADMIN — LIDOCAINE HYDROCHLORIDE 80 MG: 20 INJECTION, SOLUTION INTRAVENOUS at 07:10

## 2018-10-30 RX ADMIN — FENTANYL CITRATE 25 MCG: 50 INJECTION, SOLUTION INTRAMUSCULAR; INTRAVENOUS at 08:10

## 2018-10-30 RX ADMIN — ROCURONIUM BROMIDE 10 MG: 10 INJECTION, SOLUTION INTRAVENOUS at 07:10

## 2018-10-30 RX ADMIN — FENTANYL CITRATE 50 MCG: 50 INJECTION, SOLUTION INTRAMUSCULAR; INTRAVENOUS at 07:10

## 2018-10-30 RX ADMIN — HYDROMORPHONE HYDROCHLORIDE 0.5 MG: 2 INJECTION INTRAMUSCULAR; INTRAVENOUS; SUBCUTANEOUS at 08:10

## 2018-10-30 RX ADMIN — MIDAZOLAM HYDROCHLORIDE 2 MG: 1 INJECTION, SOLUTION INTRAMUSCULAR; INTRAVENOUS at 06:10

## 2018-10-30 RX ADMIN — HYDROMORPHONE HYDROCHLORIDE 0.5 MG: 2 INJECTION INTRAMUSCULAR; INTRAVENOUS; SUBCUTANEOUS at 09:10

## 2018-10-30 RX ADMIN — SENNOSIDES AND DOCUSATE SODIUM 1 TABLET: 8.6; 5 TABLET ORAL at 08:10

## 2018-10-30 RX ADMIN — SUCCINYLCHOLINE CHLORIDE 100 MG: 20 INJECTION, SOLUTION INTRAMUSCULAR; INTRAVENOUS at 07:10

## 2018-10-30 RX ADMIN — ONDANSETRON 8 MG: 2 INJECTION, SOLUTION INTRAMUSCULAR; INTRAVENOUS at 08:10

## 2018-10-30 RX ADMIN — GLYCOPYRROLATE 0.6 MG: 0.2 INJECTION, SOLUTION INTRAMUSCULAR; INTRAVENOUS at 08:10

## 2018-10-30 RX ADMIN — FENTANYL CITRATE 100 MCG: 50 INJECTION, SOLUTION INTRAMUSCULAR; INTRAVENOUS at 07:10

## 2018-10-30 RX ADMIN — SODIUM CHLORIDE, SODIUM LACTATE, POTASSIUM CHLORIDE, AND CALCIUM CHLORIDE: .6; .31; .03; .02 INJECTION, SOLUTION INTRAVENOUS at 06:10

## 2018-10-30 RX ADMIN — ROCURONIUM BROMIDE 25 MG: 10 INJECTION, SOLUTION INTRAVENOUS at 07:10

## 2018-10-30 RX ADMIN — KETOROLAC TROMETHAMINE 30 MG: 30 INJECTION, SOLUTION INTRAMUSCULAR at 05:10

## 2018-10-30 RX ADMIN — PROPOFOL 30 MG: 10 INJECTION, EMULSION INTRAVENOUS at 07:10

## 2018-10-30 RX ADMIN — NEOSTIGMINE METHYLSULFATE 4 MG: 1 INJECTION INTRAVENOUS at 08:10

## 2018-10-30 RX ADMIN — FENTANYL CITRATE 50 MCG: 50 INJECTION, SOLUTION INTRAMUSCULAR; INTRAVENOUS at 08:10

## 2018-10-30 RX ADMIN — DEXAMETHASONE SODIUM PHOSPHATE 8 MG: 4 INJECTION, SOLUTION INTRAMUSCULAR; INTRAVENOUS at 07:10

## 2018-10-30 RX ADMIN — ACETAMINOPHEN 1000 MG: 10 INJECTION, SOLUTION INTRAVENOUS at 07:10

## 2018-10-30 RX ADMIN — ROCURONIUM BROMIDE 5 MG: 10 INJECTION, SOLUTION INTRAVENOUS at 07:10

## 2018-10-30 RX ADMIN — Medication: at 10:10

## 2018-10-30 RX ADMIN — KETOROLAC TROMETHAMINE 60 MG: 30 INJECTION, SOLUTION INTRAMUSCULAR; INTRAVENOUS at 08:10

## 2018-10-30 NOTE — INTERVAL H&P NOTE
The patient has been examined and the H&P has been reviewed:    I concur with the findings and no changes have occurred since H&P was written.   Lab Results   Component Value Date    WBC 6.65 10/23/2018    WBC 6.65 10/23/2018    HGB 13.9 10/23/2018    HGB 13.9 10/23/2018    HCT 41.4 10/23/2018    HCT 41.4 10/23/2018    MCV 80 (L) 10/23/2018    MCV 80 (L) 10/23/2018     10/23/2018     10/23/2018       Anesthesia/Surgery risks, benefits and alternative options discussed and understood by patient/family.          Active Hospital Problems    Diagnosis  POA    Fibroids [D21.9]  Yes      Resolved Hospital Problems   No resolved problems to display.

## 2018-10-30 NOTE — ANESTHESIA POSTPROCEDURE EVALUATION
"Anesthesia Post Evaluation    Patient: Francie Redding    Procedure(s) Performed: Procedure(s) (LRB):  HYSTERECTOMY, SUPRACERVICAL (N/A)    Final Anesthesia Type: general  Patient location during evaluation: PACU  Patient participation: Yes- Able to Participate  Level of consciousness: awake and alert  Post-procedure vital signs: reviewed and stable  Pain management: adequate  Airway patency: patent  PONV status at discharge: No PONV  Anesthetic complications: no      Cardiovascular status: blood pressure returned to baseline  Respiratory status: unassisted  Hydration status: euvolemic  Follow-up not needed.        Visit Vitals  BP (!) 145/74   Pulse 64   Temp 36.6 °C (97.8 °F) (Skin)   Resp 16   Ht 5' 1" (1.549 m)   Wt 71.7 kg (158 lb)   SpO2 97%   BMI 29.85 kg/m²       Pain/Cedrick Score: Pain Assessment Performed: Yes (10/30/2018  8:40 AM)  Presence of Pain: non-verbal indicators absent (10/30/2018  8:40 AM)  Pain Rating Prior to Med Admin: 7 (10/30/2018  9:20 AM)  Cedrick Score: 8 (10/30/2018  8:55 AM)        "

## 2018-10-30 NOTE — TRANSFER OF CARE
"Anesthesia Transfer of Care Note    Patient: Francie Redding    Procedure(s) Performed: Procedure(s) (LRB):  HYSTERECTOMY, SUPRACERVICAL (N/A)    Patient location: PACU    Anesthesia Type: general    Transport from OR: Transported from OR on 6-10 L/min O2 by face mask with adequate spontaneous ventilation    Post pain: adequate analgesia    Post assessment: no apparent anesthetic complications and tolerated procedure well    Post vital signs: stable    Level of consciousness: awake    Nausea/Vomiting: no nausea/vomiting    Complications: none    Transfer of care protocol was followed      Last vitals:   Visit Vitals  BP (!) 141/75 (BP Location: Right arm, Patient Position: Lying)   Pulse 61   Temp 36.8 °C (98.2 °F) (Temporal)   Resp 20   Ht 5' 1" (1.549 m)   Wt 71.7 kg (158 lb)   SpO2 97%   BMI 29.85 kg/m²     "

## 2018-10-30 NOTE — BRIEF OP NOTE
[facility]  Brief Operative Note     SUMMARY     Surgery Date: 10/30/2018     Surgeon(s) and Role:     * Tank Moon MD - Primary     * Teimtope Leal MD - Assisting        Pre-op Diagnosis:  Fibroids [D25.9]    Post-op Diagnosis:  Post-Op Diagnosis Codes:     * Fibroids [D21.9]    Procedure(s) (LRB):  HYSTERECTOMY, SUPRACERVICAL (N/A)  Bilateral salpingectomy  Anesthesia: General    Description of the findings of the procedure: fibroid uterus, nml anatomy      Estimated Blood Loss:  50cc         Specimens:   Specimen (12h ago, onward)    Start     Ordered    10/30/18 0807  Specimen to Pathology - Surgery  Once     Comments:  Pre-op Diagnosis: Fibroids [D25.9]Post-op Diagnosis: sameProcedure(s):HYSTERECTOMY, SUPRACERVICAL Number of specimens: 1Name of specimens: 1. Uterus and bilateral fallopian tubes (perm)     Start Status   10/30/18 0807 Collected (10/30/18 0807)       10/30/18 0807          Tank Moon MD

## 2018-10-30 NOTE — OP NOTE
DATE OF PROCEDURE:  10/30/2018.    PREOPERATIVE DIAGNOSIS:  Symptomatic uterine fibroids.    POSTOPERATIVE DIAGNOSIS:  Symptomatic uterine fibroids.    PROCEDURE:  Supracervical abdominal hysterectomy with bilateral salpingectomy.    SURGEON:  Tank Moon M.D.    ASSISTANT:  Ms. Temitope Leal as well as Ms. Vilma Williamson.    ESTIMATED BLOOD LOSS:  Approximately 50 mL    ANESTHESIA:  General endotracheal.    COMPLICATIONS:  None.    PROCEDURE IN DETAIL:  After assuring informed consent, the patient was   transferred to the Operating Room where she underwent general endotracheal   anesthesia without difficulty.  Abdomen and perineum were prepped and draped in   normal sterile fashion in dorsal supine position.  Marroquin catheter was inserted   under sterile conditions.  Following draping, a Pfannenstiel skin incision was   made with a scalpel and carried through to underlying layer of fascia with   Bovie.  Fascia was incised in the midline and extended laterally with Bovie   cautery.  Underlying rectus muscles were dissected off.  Rectus was  in   midline.  Peritoneum was grasped with two hemostats and elevated and entered   with Metzenbaum scissors.  The peritoneal incision was extended superiorly and   inferiorly.  The O'Jeremi-O'Herman retractor was then assembled.  Bladder   blade was placed first.  The patient was placed in a slight Trendelenburg   position.  The bowel was packed away and the upper blade was placed.  Survey of   the patient's uterus revealed about a 14-16-week size uterus with multiple   fibroids, normal ovaries bilaterally with some minimal adhesions of the right   ovary to the posterior wall of the uterus.  Next, the cornua of the uterus was   grasped with 2 Alberta clamps and elevated.  The right and left round ligaments   were then cauterized and transected with the handheld LigaSure device.  The   bladder flap was then developed anteriorly and the bladder was pushed downward.     Next, the right and left fallopian tubes were elevated and cauterized across   the mesosalpinx with the handheld LigaSure, and then the right and left   uteroovarian pedicles were then doubly cauterized and transected as well with   the LigaSure and taken down to the level of the uterine artery on both sides.    The uterine arteries on each side were then doubly cauterized with the LigaSure   with excellent hemostasis.  The Bovie was then used to excise the uterus and the   fallopian tubes from the cervical stump and the specimen was handed off the   field.  The remaining cervical stump was grasped with 2 [facility] clamps and made   hemostatic with Bovie cautery.  The endocervical canal was then cauterized and   the apex of the cervical stump was then suture ligated with 0 Vicryl sutures   with 8 interrupted figure-of-eight stitches with excellent hemostasis.  The   pelvis was then generously irrigated and excellent hemostasis was confirmed.    The bowel was unpacked.  The O'Jeremi-O'Herman was removed.  Kellys were used   to grasp the peritoneum and the rectus and peritoneum were then closed together   with 2-0 running Vicryl stitch.  Rectus irrigated and noted to be hemostatic.    Fascia was closed with #1 running Vicryl suture.  Subcutaneous tissues were   generously irrigated and reapproximated with 2-0 and the skin was closed with   4-0 Vicryl in running subcuticular stitch.  Steri-Strips and an Aquacel bandage   were applied.  The patient was transferred to the Recovery Room in stable   condition.    PATHOLOGICAL SPECIMEN:  Includes uterus and bilateral tubes.      RIO  dd: 10/30/2018 11:59:32 (SUPRIYA)  td: 10/30/2018 13:41:09 (SUPRIYA)  Doc ID   #[id number]  Job ID #[id number]    CC:

## 2018-10-31 LAB
BASOPHILS # BLD AUTO: 0 K/UL
BASOPHILS NFR BLD: 0 %
DIFFERENTIAL METHOD: ABNORMAL
EOSINOPHIL # BLD AUTO: 0 K/UL
EOSINOPHIL NFR BLD: 0 %
ERYTHROCYTE [DISTWIDTH] IN BLOOD BY AUTOMATED COUNT: 16.7 %
HCT VFR BLD AUTO: 37.2 %
HGB BLD-MCNC: 12.7 G/DL
LYMPHOCYTES # BLD AUTO: 1.6 K/UL
LYMPHOCYTES NFR BLD: 12.8 %
MCH RBC QN AUTO: 27.1 PG
MCHC RBC AUTO-ENTMCNC: 34.1 G/DL
MCV RBC AUTO: 80 FL
MONOCYTES # BLD AUTO: 1 K/UL
MONOCYTES NFR BLD: 7.9 %
NEUTROPHILS # BLD AUTO: 10 K/UL
NEUTROPHILS NFR BLD: 79 %
PLATELET # BLD AUTO: 215 K/UL
PMV BLD AUTO: 9.1 FL
RBC # BLD AUTO: 4.68 M/UL
WBC # BLD AUTO: 12.69 K/UL

## 2018-10-31 PROCEDURE — 11000001 HC ACUTE MED/SURG PRIVATE ROOM

## 2018-10-31 PROCEDURE — 99900035 HC TECH TIME PER 15 MIN (STAT)

## 2018-10-31 PROCEDURE — 63600175 PHARM REV CODE 636 W HCPCS: Performed by: OBSTETRICS & GYNECOLOGY

## 2018-10-31 PROCEDURE — 27000221 HC OXYGEN, UP TO 24 HOURS

## 2018-10-31 PROCEDURE — 25000003 PHARM REV CODE 250: Performed by: OBSTETRICS & GYNECOLOGY

## 2018-10-31 PROCEDURE — 85025 COMPLETE CBC W/AUTO DIFF WBC: CPT

## 2018-10-31 PROCEDURE — 36415 COLL VENOUS BLD VENIPUNCTURE: CPT

## 2018-10-31 PROCEDURE — 94761 N-INVAS EAR/PLS OXIMETRY MLT: CPT

## 2018-10-31 PROCEDURE — 94770 HC EXHALED C02 TEST: CPT

## 2018-10-31 RX ORDER — SIMETHICONE 125 MG
125 TABLET,CHEWABLE ORAL EVERY 6 HOURS PRN
Status: DISCONTINUED | OUTPATIENT
Start: 2018-10-31 | End: 2018-10-31

## 2018-10-31 RX ORDER — SIMETHICONE 125 MG
125 TABLET,CHEWABLE ORAL EVERY 6 HOURS PRN
Status: DISCONTINUED | OUTPATIENT
Start: 2018-10-31 | End: 2018-11-01 | Stop reason: HOSPADM

## 2018-10-31 RX ADMIN — SIMETHICONE 125 MG: 125 TABLET, CHEWABLE ORAL at 08:10

## 2018-10-31 RX ADMIN — DIPHENHYDRAMINE HYDROCHLORIDE 25 MG: 25 CAPSULE ORAL at 07:10

## 2018-10-31 RX ADMIN — OXYCODONE HYDROCHLORIDE AND ACETAMINOPHEN 1 TABLET: 10; 325 TABLET ORAL at 01:10

## 2018-10-31 RX ADMIN — SENNOSIDES AND DOCUSATE SODIUM 1 TABLET: 8.6; 5 TABLET ORAL at 08:10

## 2018-10-31 RX ADMIN — IBUPROFEN 600 MG: 600 TABLET, FILM COATED ORAL at 11:10

## 2018-10-31 RX ADMIN — IBUPROFEN 600 MG: 600 TABLET, FILM COATED ORAL at 05:10

## 2018-10-31 RX ADMIN — KETOROLAC TROMETHAMINE 30 MG: 30 INJECTION, SOLUTION INTRAMUSCULAR at 05:10

## 2018-10-31 RX ADMIN — KETOROLAC TROMETHAMINE 30 MG: 30 INJECTION, SOLUTION INTRAMUSCULAR at 12:10

## 2018-10-31 RX ADMIN — OXYCODONE HYDROCHLORIDE AND ACETAMINOPHEN 1 TABLET: 10; 325 TABLET ORAL at 10:10

## 2018-10-31 NOTE — PROGRESS NOTES
[facility]  Obstetrics & Gynecology  Progress Note    Patient Name: Francie Redding  MRN: [id number]  Admission Date: 10/30/2018  Primary Care Provider: Primary Doctor No  Principal Problem: <principal problem not specified>    Subjective:     Interval History:  Pt is doing well and ambulating well, eating well and no other complaints for the day.     Scheduled Meds:   ibuprofen  600 mg Oral Q6H    pantoprazole  40 mg Intravenous Before breakfast    senna-docusate 8.6-50 mg  1 tablet Oral BID     Continuous Infusions:   lactated ringers       PRN Meds:bisacodyl, diphenhydrAMINE, naloxone, ondansetron, oxyCODONE-acetaminophen, oxyCODONE-acetaminophen    Review of patient's allergies indicates:  No Known Allergies    Objective:     Vital Signs (Most Recent):  Temp: 98.9 °F (37.2 °C) (10/31/18 0800)  Pulse: 65 (10/31/18 0837)  Resp: 20 (10/31/18 1118)  BP: 119/68 (10/31/18 0800)  SpO2: 98 % (10/31/18 0837) Vital Signs (24h Range):  Temp:  [98 °F (36.7 °C)-98.9 °F (37.2 °C)] 98.9 °F (37.2 °C)  Pulse:  [65-81] 65  Resp:  [16-24] 20  SpO2:  [96 %-100 %] 98 %  BP: (119-134)/(68-88) 119/68     Weight: 71.7 kg (158 lb)  Body mass index is 29.85 kg/m².  No LMP recorded. Patient has had an injection.    I&O (Last 24H):    Intake/Output Summary (Last 24 hours) at 10/31/2018 1308  Last data filed at 10/31/2018 1106  Gross per 24 hour   Intake --   Output 3700 ml   Net -3700 ml       Physical Exam:   Constitutional: She is oriented to person, place, and time. She appears well-developed and well-nourished.    HENT:   Head: Normocephalic and atraumatic.    Eyes: Conjunctivae are normal.     Cardiovascular: Normal rate and regular rhythm.     Pulmonary/Chest: Effort normal and breath sounds normal.        Abdominal: Soft. Bowel sounds are normal. She exhibits abdominal incision. There is tenderness.             Musculoskeletal: Normal range of motion.       Neurological: She is alert and oriented to person,  place, and time.    Skin: Skin is warm.          Assessment/Plan:     Active Diagnoses:    Diagnosis Date Noted POA    Fibroids [D21.9] 10/30/2018 Yes      Problems Resolved During this Admission:     - Pt had a hysterectomy done yesterday.   - She is doing well. Pain is in control with the pain medication.   - Tolerating feeding.       Bruce Viveros MD  Obstetrics & Gynecology  [facility]    I have reviewed the notes, assessments, and/or procedures performed by the resident MD, I concur with her/his documentation of Francie Redding.

## 2018-11-01 ENCOUNTER — TELEPHONE (OUTPATIENT)
Dept: OBSTETRICS AND GYNECOLOGY | Facility: CLINIC | Age: 43
End: 2018-11-01

## 2018-11-01 VITALS
DIASTOLIC BLOOD PRESSURE: 78 MMHG | SYSTOLIC BLOOD PRESSURE: 136 MMHG | TEMPERATURE: 99 F | OXYGEN SATURATION: 95 % | WEIGHT: 158 LBS | HEIGHT: 61 IN | RESPIRATION RATE: 18 BRPM | HEART RATE: 60 BPM | BODY MASS INDEX: 29.83 KG/M2

## 2018-11-01 PROCEDURE — C9113 INJ PANTOPRAZOLE SODIUM, VIA: HCPCS | Performed by: OBSTETRICS & GYNECOLOGY

## 2018-11-01 PROCEDURE — 25000003 PHARM REV CODE 250: Performed by: OBSTETRICS & GYNECOLOGY

## 2018-11-01 PROCEDURE — 94761 N-INVAS EAR/PLS OXIMETRY MLT: CPT

## 2018-11-01 PROCEDURE — 99238 HOSP IP/OBS DSCHRG MGMT 30/<: CPT | Mod: ,,, | Performed by: OBSTETRICS & GYNECOLOGY

## 2018-11-01 PROCEDURE — 63600175 PHARM REV CODE 636 W HCPCS: Performed by: OBSTETRICS & GYNECOLOGY

## 2018-11-01 RX ORDER — OXYCODONE AND ACETAMINOPHEN 5; 325 MG/1; MG/1
1 TABLET ORAL EVERY 4 HOURS PRN
Qty: 30 TABLET | Refills: 0 | Status: SHIPPED | OUTPATIENT
Start: 2018-11-01 | End: 2018-11-06

## 2018-11-01 RX ADMIN — SENNOSIDES AND DOCUSATE SODIUM 1 TABLET: 8.6; 5 TABLET ORAL at 08:11

## 2018-11-01 RX ADMIN — OXYCODONE HYDROCHLORIDE AND ACETAMINOPHEN 1 TABLET: 5; 325 TABLET ORAL at 05:11

## 2018-11-01 RX ADMIN — OXYCODONE HYDROCHLORIDE AND ACETAMINOPHEN 1 TABLET: 10; 325 TABLET ORAL at 01:11

## 2018-11-01 RX ADMIN — IBUPROFEN 600 MG: 600 TABLET, FILM COATED ORAL at 05:11

## 2018-11-01 RX ADMIN — DIPHENHYDRAMINE HYDROCHLORIDE 25 MG: 25 CAPSULE ORAL at 01:11

## 2018-11-01 RX ADMIN — IBUPROFEN 600 MG: 600 TABLET, FILM COATED ORAL at 01:11

## 2018-11-01 RX ADMIN — PANTOPRAZOLE SODIUM 40 MG: 40 INJECTION, POWDER, FOR SOLUTION INTRAVENOUS at 05:11

## 2018-11-01 NOTE — TELEPHONE ENCOUNTER
Spoke with pharmacist and she stated that the pt's prescription was high and asked to get it changed to either 1 for q4-6hrs or 1 to 2 q6hrs for the oxycodone-acetaminophen. Pharmacist stated that they could not receive a verbal since it is a controlled prescription. Pharmacist asked to get it sent electronically/written and to cancel the previous one. Please advice.

## 2018-11-01 NOTE — TELEPHONE ENCOUNTER
----- Message from Syeda Holman sent at 11/1/2018 10:52 AM CDT -----  Contact: [facility]  [phone]  Pharmacist states Rx needs to be changed 1 tablet every 4 to 6 hours or 1 to 2 tablets every 6 hours for prescription oxyCODONE-acetaminophen 5-325 mg per tablet 1 tablet

## 2018-11-01 NOTE — PLAN OF CARE
Problem: Patient Care Overview  Goal: Plan of Care Review  NO DISTRESS NOTED      
Problem: Patient Care Overview  Goal: Plan of Care Review  Outcome: Ongoing (interventions implemented as appropriate)     Pt awake and oriented.  Ambulating independently. Tolerating regular diet.  Bowel sounds active x4; is now passing flatus.  LTV incision with aquacel dressing c/d/i.  Pain management discussed and maintained; patient reports pain controlled with ordered medications.  Adult son at bedside throughout shift.  Bed in low position, side rails up x2, call light within reach, patient safety maintained. Educated patient to call for any needs or assistance. Patient verbalized understanding.  VSS. NAD noted. Will continue to monitor.      
Problem: Patient Care Overview  Goal: Plan of Care Review  Outcome: Ongoing (interventions implemented as appropriate)  POC reviewed with patient. Patient tolerating regular diet, VSS, afebrile. Pain well controlled with prescribed medications. No distress noted. Will continue to monitor.        
Problem: Patient Care Overview  Goal: Plan of Care Review  Outcome: Ongoing (interventions implemented as appropriate)  POC reviewed with patient. Pt verbalized understanding. VSS. NAD noted. PCA pump d/c'd today per MD's orders; pt reports pain goal met with prescribed PO medications per MD.  Ambulating freely in room.  Tolerating regular diet. Family support noted at bedside.  Remains free from falls and injury. Will continue to monitor.         
Problem: Patient Care Overview  Goal: Plan of Care Review  Outcome: Ongoing (interventions implemented as appropriate)  PT on RA, no respiratory distress noted. Will continue to monitor.        
Problem: Patient Care Overview  Goal: Plan of Care Review  Outcome: Ongoing (interventions implemented as appropriate)  Pt on RA with documented sats. No distress noted. EtCO2 32. Will continue to monitor.       
Signed out from pacu per Dr Marquez. Report called to Hayde R/JOVANNA. Spoke with  Patient's son to give update on patient status & room assignment; verbalized understanding.  
To room 354 via stretcher,sr upx2. Denies any discomfort at present.  
No

## 2018-11-06 ENCOUNTER — OFFICE VISIT (OUTPATIENT)
Dept: OBSTETRICS AND GYNECOLOGY | Facility: CLINIC | Age: 43
End: 2018-11-06
Payer: MEDICAID

## 2018-11-06 ENCOUNTER — TELEPHONE (OUTPATIENT)
Dept: OBSTETRICS AND GYNECOLOGY | Facility: CLINIC | Age: 43
End: 2018-11-06

## 2018-11-06 VITALS
BODY MASS INDEX: 30.01 KG/M2 | SYSTOLIC BLOOD PRESSURE: 110 MMHG | HEIGHT: 61 IN | DIASTOLIC BLOOD PRESSURE: 84 MMHG | WEIGHT: 158.94 LBS

## 2018-11-06 DIAGNOSIS — Z98.890 POST-OPERATIVE STATE: Primary | ICD-10-CM

## 2018-11-06 PROCEDURE — 99213 OFFICE O/P EST LOW 20 MIN: CPT | Mod: PBBFAC,PO | Performed by: OBSTETRICS & GYNECOLOGY

## 2018-11-06 PROCEDURE — 99999 PR PBB SHADOW E&M-EST. PATIENT-LVL III: CPT | Mod: PBBFAC,,, | Performed by: OBSTETRICS & GYNECOLOGY

## 2018-11-06 PROCEDURE — 99499 UNLISTED E&M SERVICE: CPT | Mod: S$PBB,,, | Performed by: OBSTETRICS & GYNECOLOGY

## 2018-11-06 RX ORDER — IBUPROFEN 800 MG/1
800 TABLET ORAL 3 TIMES DAILY
COMMUNITY
End: 2018-12-18

## 2018-11-06 NOTE — TELEPHONE ENCOUNTER
----- Message from Kelsie Mcdowell sent at 11/6/2018  8:32 AM CST -----  Patient called wants to know what time can she come today for insicion check. Patient is requesting a call back. Thank you.

## 2018-12-18 ENCOUNTER — OFFICE VISIT (OUTPATIENT)
Dept: OBSTETRICS AND GYNECOLOGY | Facility: CLINIC | Age: 43
End: 2018-12-18
Payer: MEDICAID

## 2018-12-18 VITALS
DIASTOLIC BLOOD PRESSURE: 86 MMHG | SYSTOLIC BLOOD PRESSURE: 138 MMHG | HEIGHT: 61 IN | BODY MASS INDEX: 29.89 KG/M2 | WEIGHT: 158.31 LBS

## 2018-12-18 DIAGNOSIS — G89.18 POST-OPERATIVE PAIN: ICD-10-CM

## 2018-12-18 DIAGNOSIS — Z12.39 SCREENING BREAST EXAMINATION: Primary | ICD-10-CM

## 2018-12-18 PROCEDURE — 99213 OFFICE O/P EST LOW 20 MIN: CPT | Mod: PBBFAC,PO | Performed by: OBSTETRICS & GYNECOLOGY

## 2018-12-18 PROCEDURE — 99024 POSTOP FOLLOW-UP VISIT: CPT | Mod: ,,, | Performed by: OBSTETRICS & GYNECOLOGY

## 2018-12-18 PROCEDURE — 99999 PR PBB SHADOW E&M-EST. PATIENT-LVL III: CPT | Mod: PBBFAC,,, | Performed by: OBSTETRICS & GYNECOLOGY

## 2018-12-18 RX ORDER — IBUPROFEN 800 MG/1
800 TABLET ORAL EVERY 6 HOURS PRN
Qty: 28 TABLET | Refills: 0 | Status: SHIPPED | OUTPATIENT
Start: 2018-12-18 | End: 2018-12-25

## 2018-12-18 NOTE — PROGRESS NOTES
"Subjective:       Francie Redding is a 43 y.o. female who presents to the clinic 6 weeks status post HYSTERECTOMY, SUPRACERVICAL  for fibroids. Eating a regular diet without difficulty. Bowel movements are normal. Pain is controlled with current analgesics. Medications being used: ibuprofen (OTC). Only use it occasionally, not routinely, not having pain today. Reported no prior hx of mammogram. Pathology has been benign.    The following portions of the patient's history were reviewed and updated as appropriate: allergies, current medications, past family history, past medical history, past social history, past surgical history and problem list.    Review of Systems  A comprehensive review of systems was negative.      Objective:      /86   Ht 5' 1" (1.549 m)   Wt 71.8 kg (158 lb 4.6 oz)   LMP 01/23/2018   BMI 29.91 kg/m²   General:  alert, appears stated age and cooperative   Abdomen: soft, bowel sounds active, non-tender   Incision:   healing well, no drainage, no erythema, no hernia, no seroma, no swelling, no dehiscence, incision well approximated       Assessment:      Doing well postoperatively.  Operative findings again reviewed. Pathology report discussed.      Plan:      1. Continue any current medications.  2. Wound care discussed.  3. Activity restrictions: none  4. Anticipated return to work: now and with restrictions: avoid heavy lifting.  5. Follow up: annually. Mammo ordered. Will refill advil one last time. Advise patient to take it only with meal.  "

## 2018-12-31 ENCOUNTER — HOSPITAL ENCOUNTER (OUTPATIENT)
Dept: RADIOLOGY | Facility: HOSPITAL | Age: 43
Discharge: HOME | End: 2018-12-31
Attending: OBSTETRICS & GYNECOLOGY
Payer: MEDICAID

## 2018-12-31 VITALS — WEIGHT: 158 LBS | HEIGHT: 61 IN | BODY MASS INDEX: 29.83 KG/M2

## 2018-12-31 DIAGNOSIS — Z12.39 SCREENING BREAST EXAMINATION: ICD-10-CM

## 2018-12-31 PROCEDURE — 77067 SCR MAMMO BI INCL CAD: CPT | Mod: 26,,, | Performed by: RADIOLOGY

## 2018-12-31 PROCEDURE — 77067 MAMMO DIGITAL SCREENING BILAT WITH TOMOSYNTHESIS_CAD: ICD-10-PCS | Mod: 26,,, | Performed by: RADIOLOGY

## 2018-12-31 PROCEDURE — 77067 SCR MAMMO BI INCL CAD: CPT | Mod: TC

## 2018-12-31 PROCEDURE — 77063 MAMMO DIGITAL SCREENING BILAT WITH TOMOSYNTHESIS_CAD: ICD-10-PCS | Mod: 26,,, | Performed by: RADIOLOGY

## 2018-12-31 PROCEDURE — 77063 BREAST TOMOSYNTHESIS BI: CPT | Mod: 26,,, | Performed by: RADIOLOGY

## 2019-01-03 ENCOUNTER — TELEPHONE (OUTPATIENT)
Dept: RADIOLOGY | Facility: HOSPITAL | Age: 44
End: 2019-01-03

## 2019-01-04 ENCOUNTER — TELEPHONE (OUTPATIENT)
Dept: OBSTETRICS AND GYNECOLOGY | Facility: CLINIC | Age: 44
End: 2019-01-04

## 2019-01-07 ENCOUNTER — TELEPHONE (OUTPATIENT)
Dept: OBSTETRICS AND GYNECOLOGY | Facility: CLINIC | Age: 44
End: 2019-01-07

## 2019-01-07 ENCOUNTER — HOSPITAL ENCOUNTER (OUTPATIENT)
Dept: RADIOLOGY | Facility: HOSPITAL | Age: 44
Discharge: HOME | End: 2019-01-07
Attending: OBSTETRICS & GYNECOLOGY
Payer: MEDICAID

## 2019-01-07 DIAGNOSIS — R92.8 ABNORMAL MAMMOGRAM: ICD-10-CM

## 2019-01-07 PROCEDURE — 76642 ULTRASOUND BREAST LIMITED: CPT | Mod: TC,LT

## 2019-01-07 PROCEDURE — 77065 DX MAMMO INCL CAD UNI: CPT | Mod: TC,LT

## 2019-01-07 PROCEDURE — 76642 US BREAST LEFT LIMITED: ICD-10-PCS | Mod: 26,LT,, | Performed by: RADIOLOGY

## 2019-01-07 PROCEDURE — 76642 ULTRASOUND BREAST LIMITED: CPT | Mod: 26,LT,, | Performed by: RADIOLOGY

## 2019-01-07 PROCEDURE — 77065 DX MAMMO INCL CAD UNI: CPT | Mod: 26,LT,, | Performed by: RADIOLOGY

## 2019-01-07 PROCEDURE — 77061 MAMMO DIGITAL DIAGNOSTIC LEFT WITH TOMOSYNTHESIS_CAD: ICD-10-PCS | Mod: 26,LT,, | Performed by: RADIOLOGY

## 2019-01-07 PROCEDURE — 77065 MAMMO DIGITAL DIAGNOSTIC LEFT WITH TOMOSYNTHESIS_CAD: ICD-10-PCS | Mod: 26,LT,, | Performed by: RADIOLOGY

## 2019-01-07 PROCEDURE — 77061 BREAST TOMOSYNTHESIS UNI: CPT | Mod: 26,LT,, | Performed by: RADIOLOGY

## 2019-01-10 NOTE — TELEPHONE ENCOUNTER
Called pt to verify that she had scheduled appt with general surgeon. Pt stated that she is scheduled for Monday.

## 2019-01-14 ENCOUNTER — HOSPITAL ENCOUNTER (OUTPATIENT)
Dept: RADIOLOGY | Facility: HOSPITAL | Age: 44
Discharge: HOME | End: 2019-01-14
Attending: OBSTETRICS & GYNECOLOGY
Payer: MEDICAID

## 2019-01-14 DIAGNOSIS — R92.8 ABNORMAL MAMMOGRAM: ICD-10-CM

## 2019-01-14 PROCEDURE — 19083 BX BREAST 1ST LESION US IMAG: CPT | Mod: LT,,, | Performed by: RADIOLOGY

## 2019-01-14 PROCEDURE — 77065 DX MAMMO INCL CAD UNI: CPT | Mod: TC,LT

## 2019-01-14 PROCEDURE — 88305 TISSUE EXAM BY PATHOLOGIST: CPT | Performed by: PATHOLOGY

## 2019-01-14 PROCEDURE — 88305 TISSUE EXAM BY PATHOLOGIST: CPT | Mod: 26,,, | Performed by: PATHOLOGY

## 2019-01-14 PROCEDURE — 27201044 US BREAST BIOPSY WITH IMAGING 1ST SITE LEFT

## 2019-01-14 PROCEDURE — 88305 TISSUE SPECIMEN TO PATHOLOGY, RADIOLOGY: ICD-10-PCS | Mod: 26,,, | Performed by: PATHOLOGY

## 2019-01-14 PROCEDURE — 19083 US BREAST BIOPSY WITH IMAGING 1ST SITE LEFT: ICD-10-PCS | Mod: LT,,, | Performed by: RADIOLOGY

## 2019-01-14 PROCEDURE — 77065 DX MAMMO INCL CAD UNI: CPT | Mod: 26,LT,, | Performed by: RADIOLOGY

## 2019-01-14 PROCEDURE — 77065 MAMMO DIGITAL DIAGNOSTIC LEFT WITH CAD: ICD-10-PCS | Mod: 26,LT,, | Performed by: RADIOLOGY

## 2019-01-14 PROCEDURE — A4648 IMPLANTABLE TISSUE MARKER: HCPCS

## 2019-01-17 ENCOUNTER — TELEPHONE (OUTPATIENT)
Dept: RADIOLOGY | Facility: HOSPITAL | Age: 44
End: 2019-01-17

## 2019-06-28 ENCOUNTER — TELEPHONE (OUTPATIENT)
Dept: RADIOLOGY | Facility: HOSPITAL | Age: 44
End: 2019-06-28

## 2019-12-16 NOTE — DISCHARGE SUMMARY
Post-Op Discharge Summary  Gynecology      Primary OB/GYN Clinician: Tank Hollisn MD  Admit Date: 10/30/2018  5:42 AM   Procedure Date: 10/30/2018  Discharge Date: 11/01/2018    Admitting Diagnosis:   Active Hospital Problems    Diagnosis    Fibroids       Hospital Course: Pt admitted on 10/30/2018  5:42 AM  , underwent hysterectomy and was d/c'd on 11/01/2018          Patient Instructions:   D/C pt. To home in stable condition  Reg diet  Ad cindy activity with pelvic rest x 6 wks  Call for fever >101, heavy vag bleeding, pain uncontrolled w/ pain meds  F/u in office in 6-8wks for final check-up  Motrin 800mg, Percocet 5/325mg      Tank Hollins MD       Quality 111:Pneumonia Vaccination Status For Older Adults: Pneumococcal Vaccination Previously Received Detail Level: Detailed Quality 131: Pain Assessment And Follow-Up: Pain assessment using a standardized tool is documented as negative, no follow-up plan required Quality 265: Biopsy Follow-Up: Biopsy results reviewed, communicated, tracked, and documented Quality 110: Preventive Care And Screening: Influenza Immunization: Influenza Immunization Administered during Influenza season

## 2020-06-02 ENCOUNTER — LAB VISIT (OUTPATIENT)
Dept: PRIMARY CARE CLINIC | Facility: CLINIC | Age: 45
End: 2020-06-02
Payer: OTHER GOVERNMENT

## 2020-06-02 DIAGNOSIS — Z03.818 ENCOUNTER FOR OBSERVATION FOR SUSPECTED EXPOSURE TO OTHER BIOLOGICAL AGENTS RULED OUT: Primary | ICD-10-CM

## 2020-06-02 PROCEDURE — U0003 INFECTIOUS AGENT DETECTION BY NUCLEIC ACID (DNA OR RNA); SEVERE ACUTE RESPIRATORY SYNDROME CORONAVIRUS 2 (SARS-COV-2) (CORONAVIRUS DISEASE [COVID-19]), AMPLIFIED PROBE TECHNIQUE, MAKING USE OF HIGH THROUGHPUT TECHNOLOGIES AS DESCRIBED BY CMS-2020-01-R: HCPCS

## 2020-06-03 LAB — SARS-COV-2 RNA RESP QL NAA+PROBE: NOT DETECTED

## 2020-10-12 ENCOUNTER — LAB VISIT (OUTPATIENT)
Dept: PRIMARY CARE CLINIC | Facility: CLINIC | Age: 45
End: 2020-10-12

## 2020-10-12 DIAGNOSIS — Z03.818 ENCOUNTER FOR OBSERVATION FOR SUSPECTED EXPOSURE TO OTHER BIOLOGICAL AGENTS RULED OUT: ICD-10-CM

## 2020-10-13 LAB — SARS-COV-2 RNA RESP QL NAA+PROBE: NEGATIVE

## 2021-07-15 NOTE — TELEPHONE ENCOUNTER
Notify pt she needs a f/u dx mmg and poss u/s of breast bc screening mmg was abnml  
Pt notified and verbalized understanding. Pt already scheduled for today.     
Pf[employer] dose 1 and 2

## (undated) DEVICE — SEE MEDLINE ITEM 152622

## (undated) DEVICE — SEE MEDLINE ITEM 157117

## (undated) DEVICE — SUT 2/0 36IN COATED VICRYL

## (undated) DEVICE — SUT CTD VICRYL 0 UND BR CT

## (undated) DEVICE — SEE MEDLINE ITEM 154981

## (undated) DEVICE — Device

## (undated) DEVICE — MANIFOLD 4 PORT

## (undated) DEVICE — COVER OVERHEAD SURG LT BLUE

## (undated) DEVICE — SEE MEDLINE ITEM 146355

## (undated) DEVICE — CONTAINER PATHOLOGY W/LID 32OZ

## (undated) DEVICE — PAD PREP 50/CA

## (undated) DEVICE — SPONGE LAP 18X18 PREWASHED

## (undated) DEVICE — APPLICATOR CHLORAPREP ORN 26ML

## (undated) DEVICE — GLOVE PROTEXIS HYDROGEL SZ7

## (undated) DEVICE — SEALER LIGASURE IMPACT 18CM

## (undated) DEVICE — SUT 2-0 VICRYL / CT-1

## (undated) DEVICE — PACK BASIC

## (undated) DEVICE — BLADE SURG CARBON STEEL #10

## (undated) DEVICE — NDL HYPO REG 25G X 1 1/2

## (undated) DEVICE — SUT CTD VICRYL CT-1 27

## (undated) DEVICE — DRESSING AQUACEL ADH 4X10IN

## (undated) DEVICE — SYR 10CC LUER LOCK

## (undated) DEVICE — ELECTRODE REM PLYHSV RETURN 9

## (undated) DEVICE — DRAPE LAVH LAPAROSCOPY W/FLUID

## (undated) DEVICE — SEE MEDLINE ITEM 157116

## (undated) DEVICE — TRAY FOLEY 16FR INFECTION CONT

## (undated) DEVICE — SEE MEDLINE ITEM 156955